# Patient Record
Sex: FEMALE | Race: BLACK OR AFRICAN AMERICAN | Employment: FULL TIME | URBAN - METROPOLITAN AREA
[De-identification: names, ages, dates, MRNs, and addresses within clinical notes are randomized per-mention and may not be internally consistent; named-entity substitution may affect disease eponyms.]

---

## 2022-07-24 ENCOUNTER — HOSPITAL ENCOUNTER (EMERGENCY)
Age: 47
Discharge: HOME | End: 2022-07-24

## 2022-07-24 VITALS
OXYGEN SATURATION: 100 % | BODY MASS INDEX: 30 KG/M2 | SYSTOLIC BLOOD PRESSURE: 148 MMHG | RESPIRATION RATE: 16 BRPM | HEIGHT: 63 IN | DIASTOLIC BLOOD PRESSURE: 87 MMHG | TEMPERATURE: 98.8 F | WEIGHT: 169.31 LBS | HEART RATE: 70 BPM

## 2022-07-24 DIAGNOSIS — R03.0 ELEVATED BLOOD PRESSURE READING: ICD-10-CM

## 2022-07-24 DIAGNOSIS — B96.89 BACTERIAL VAGINOSIS: ICD-10-CM

## 2022-07-24 DIAGNOSIS — N89.8 VAGINAL ODOR: Primary | ICD-10-CM

## 2022-07-24 DIAGNOSIS — N76.0 BACTERIAL VAGINOSIS: ICD-10-CM

## 2022-07-24 LAB
BACTERIA WET PREP: ABNORMAL
BACTERIA: ABNORMAL /HPF
BILIRUBIN URINE: NEGATIVE
BLOOD, URINE: NEGATIVE
CLARITY: CLEAR
CLUE CELLS: ABNORMAL
COLOR: YELLOW
EPITHELIAL CELLS WET PREP: ABNORMAL
EPITHELIAL CELLS, UA: 8 /HPF (ref 0–5)
GLUCOSE URINE: NEGATIVE MG/DL
HCG(URINE) PREGNANCY TEST: NEGATIVE
HYALINE CASTS: 0 /LPF (ref 0–8)
KETONES, URINE: NEGATIVE MG/DL
LEUKOCYTE ESTERASE, URINE: ABNORMAL
MICROSCOPIC EXAMINATION: YES
NITRITE, URINE: NEGATIVE
PH UA: 7 (ref 5–8)
PROTEIN UA: NEGATIVE MG/DL
RBC UA: 1 /HPF (ref 0–4)
RBC WET PREP: ABNORMAL
SOURCE WET PREP: ABNORMAL
SPECIFIC GRAVITY UA: 1.02 (ref 1–1.03)
TRICHOMONAS PREP: ABNORMAL
URINE REFLEX TO CULTURE: ABNORMAL
URINE TYPE: ABNORMAL
UROBILINOGEN, URINE: 1 E.U./DL
WBC UA: 3 /HPF (ref 0–5)
WBC WET PREP: ABNORMAL
YEAST WET PREP: ABNORMAL

## 2022-07-24 PROCEDURE — 81001 URINALYSIS AUTO W/SCOPE: CPT

## 2022-07-24 PROCEDURE — 87210 SMEAR WET MOUNT SALINE/INK: CPT

## 2022-07-24 PROCEDURE — 87591 N.GONORRHOEAE DNA AMP PROB: CPT

## 2022-07-24 PROCEDURE — 99283 EMERGENCY DEPT VISIT LOW MDM: CPT

## 2022-07-24 PROCEDURE — 87491 CHLMYD TRACH DNA AMP PROBE: CPT

## 2022-07-24 PROCEDURE — 84703 CHORIONIC GONADOTROPIN ASSAY: CPT

## 2022-07-24 RX ORDER — LISINOPRIL 20 MG/1
30 TABLET ORAL DAILY
COMMUNITY

## 2022-07-24 RX ORDER — METRONIDAZOLE 500 MG/1
500 TABLET ORAL 2 TIMES DAILY
Qty: 14 TABLET | Refills: 0 | Status: SHIPPED | OUTPATIENT
Start: 2022-07-24 | End: 2022-07-31

## 2022-07-24 ASSESSMENT — PAIN - FUNCTIONAL ASSESSMENT: PAIN_FUNCTIONAL_ASSESSMENT: 0-10

## 2022-07-24 ASSESSMENT — PAIN SCALES - GENERAL: PAINLEVEL_OUTOF10: 0

## 2022-07-24 NOTE — ED NOTES
Provider order placed for patient's discharge. Provider reviewed decision to discharge with the patient. Discharge paperwork and any prescriptions were reviewed with the patient. Patient verbalized understanding of discharge education and any prescriptions and has no further questions or further needs at this time. Patient left with all personal belongings and was stable upon departure. Patient thanked for choosing [facility] ([facility]) and informed to return should any need arise.        Izzy Solis RN  07/24/22 9832

## 2022-07-24 NOTE — ED PROVIDER NOTES
[address] [facility]        Pt Name: Tiffany Ingram  MRN: [id number]  [place] 1975  Date of evaluation: 7/24/2022  Provider: GER Bedolla      ADVANCED PRACTICE PROVIDER, I HAVE EVALUATED THIS PATIENT    CHIEF COMPLAINT     Vaginal odor      HISTORY OF PRESENT ILLNESS  (Location/Symptom, Timing/Onset, Context/Setting, Quality, Duration,Modifying Factors, Severity.)   Tiffany Ingram is a 40 y.o. female who presents to the emergencydepartment for vaginal odor for the past 1.5-2 weeks. Has a little bit of vaginal discharge. No bleeding. Denies dysuria, vaginal bleeding, genital sores or rashes. Denies fever nausea vomiting abdominal pain. Nursing Notes were reviewed and I agree. REVIEW OF SYSTEMS    (2-9 systems for level 4, 10 or more for level 5)   Review of Systems     Pertinent positives and negatives as per HPI. All other systems reviewed and are negative except as noted    PAST MEDICAL HISTORY         Diagnosis Date    Hypertension        SURGICAL HISTORY   History reviewed. No pertinent surgical history. CURRENT MEDICATIONS       Previous Medications    LISINOPRIL (PRINIVIL;ZESTRIL) 20 MG TABLET    Take 30 mg by mouth in the morning. ALLERGIES     Patient has no known allergies. FAMILY HISTORY     History reviewed. No pertinent family history. No family status information on file. SOCIAL HISTORY      reports that she has never smoked. She has never been exposed to tobacco smoke. She has never used smokeless tobacco. She reports current alcohol use. She reports that she does not use drugs.     PHYSICAL EXAM    (up to 7 for level 4, 8 or more for level 5)     ED Triage Vitals [07/24/22 1812]   BP Temp Temp Source Heart Rate Resp SpO2 Height Weight   (!) 171/101 98.8 °F (37.1 °C) Oral 78 16 100 % 5' 2.5\" (1.588 m) 169 lb 5 oz (76.8 kg)       Physical Exam  Constitutional:       General: She is not in acute distress. Appearance: Normal appearance. She is well-developed. She is not ill-appearing, toxic-appearing or diaphoretic. HENT:      Head: Normocephalic and atraumatic. Pulmonary:      Effort: Pulmonary effort is normal. No respiratory distress. Abdominal:      General: There is no distension. Palpations: Abdomen is soft. There is no mass. Tenderness: no abdominal tenderness There is no guarding or rebound. Hernia: No hernia is present. Musculoskeletal:         General: Normal range of motion. Cervical back: Normal range of motion and neck supple. Skin:     General: Skin is warm. Neurological:      Mental Status: She is alert. Psychiatric:         Mood and Affect: Mood normal.         Behavior: Behavior normal.         Thought Content:  Thought content normal.         Judgment: Judgment normal.       DIFFERENTIAL DIAGNOSIS   BV, yeast, STD, UTI, other    DIAGNOSTICRESULTS         RADIOLOGY:   Non-plain film images such as CT, Ultrasound and MRI are read by the radiologist. Plain radiographic images are visualized and preliminarily interpreted by GER Loya with the below findings:      Interpretation per the Radiologist below, if available at the time of this note:    No orders to display         LABS:  Labs Reviewed   WET PREP, GENITAL - Abnormal; Notable for the following components:       Result Value    Clue Cells, Wet Prep 1+ (*)     All other components within normal limits   URINALYSIS WITH REFLEX TO CULTURE - Abnormal; Notable for the following components:    Leukocyte Esterase, Urine TRACE (*)     All other components within normal limits   MICROSCOPIC URINALYSIS - Abnormal; Notable for the following components:    Bacteria, UA Rare (*)     Epithelial Cells, UA 8 (*)     All other components within normal limits   C.TRACHOMATIS N.GONORRHOEAE DNA   PREGNANCY, URINE       All other labs were within normal range or not returned as of this dictation. EMERGENCY DEPARTMENT COURSE and DIFFERENTIALDIAGNOSIS/MDM:   Vitals:    Vitals:    07/24/22 1812 07/24/22 1856   BP: (!) 171/101 (!) 159/90   Pulse: 78 76   Resp: 16 17   Temp: 98.8 °F (37.1 °C)    TempSrc: Oral    SpO2: 100% 97%   Weight: 169 lb 5 oz (76.8 kg)    Height: 5' 2.5\" (1.588 m)        Patient wasnontoxic, well appearing, afebrile with normal vital signs with exception of hypertension 171/101. Adele Raddle Urine and pre gneg. Wet prep with clue cells. Will treat with flagyl. FU with PCP in next few days for reeval and return for worsening. Is this patient to be included in the SEP-1 Core Measure due to severe sepsis or septic shock? No   Exclusion criteria - the patient is NOT to be included for SEP-1 Core Measure due to:  2+ SIRS criteria are not met        PROCEDURES:  None    FINAL IMPRESSION      1. Vaginal odor    2. Elevated blood pressure reading    3. Bacterial vaginosis        DISPOSITION/PLAN   DISPOSITION Decision To Discharge 07/24/2022 07:17:40 PM      PATIENT REFERRED TO:  Edward Lubin  [phone]  Schedule an appointment as soon as possible for a visit   for reevaluation and to recheck your blood pressure    [facility] Emergency Department  [address] [place] [address]  [phone]    As needed, If symptoms worsen    DISCHARGE MEDICATIONS:  New Prescriptions    METRONIDAZOLE (FLAGYL) 500 MG TABLET    Take 1 tablet by mouth in the morning and 1 tablet before bedtime. Do all this for 7 days.        (Please note that portions ofthis note were completed with a voice recognition program.  Efforts were made to edit the dictations but occasionally words are mis-transcribed.)    Tarik Brandon, [address] [place] [address]  07/24/22 1921

## 2022-07-27 LAB
C TRACH DNA GENITAL QL NAA+PROBE: NEGATIVE
N. GONORRHOEAE DNA: NEGATIVE

## 2022-11-29 ENCOUNTER — HOSPITAL ENCOUNTER (EMERGENCY)
Age: 47
Discharge: HOME | End: 2022-11-29

## 2022-11-29 ENCOUNTER — APPOINTMENT (OUTPATIENT)
Dept: GENERAL RADIOLOGY | Age: 47
End: 2022-11-29

## 2022-11-29 VITALS
DIASTOLIC BLOOD PRESSURE: 80 MMHG | WEIGHT: 170.19 LBS | SYSTOLIC BLOOD PRESSURE: 132 MMHG | BODY MASS INDEX: 31.32 KG/M2 | RESPIRATION RATE: 16 BRPM | TEMPERATURE: 98.3 F | HEIGHT: 62 IN | OXYGEN SATURATION: 99 % | HEART RATE: 90 BPM

## 2022-11-29 DIAGNOSIS — B34.9 VIRAL ILLNESS: Primary | ICD-10-CM

## 2022-11-29 DIAGNOSIS — K52.9 GASTROENTERITIS: ICD-10-CM

## 2022-11-29 LAB
ALBUMIN SERPL-MCNC: 4 G/DL (ref 3.4–5)
ALP BLD-CCNC: 76 U/L (ref 40–129)
ALT SERPL-CCNC: 18 U/L (ref 10–40)
ANION GAP SERPL CALCULATED.3IONS-SCNC: 14 MMOL/L (ref 3–16)
AST SERPL-CCNC: 17 U/L (ref 15–37)
BACTERIA: NORMAL /HPF
BASOPHILS ABSOLUTE: 0 K/UL (ref 0–0.2)
BASOPHILS RELATIVE PERCENT: 0.4 %
BILIRUB SERPL-MCNC: <0.2 MG/DL (ref 0–1)
BILIRUBIN DIRECT: <0.2 MG/DL (ref 0–0.3)
BILIRUBIN URINE: NEGATIVE
BILIRUBIN, INDIRECT: NORMAL MG/DL (ref 0–1)
BLOOD, URINE: NEGATIVE
BUN BLDV-MCNC: 10 MG/DL (ref 7–20)
CALCIUM SERPL-MCNC: 9.4 MG/DL (ref 8.3–10.6)
CHLORIDE BLD-SCNC: 100 MMOL/L (ref 99–110)
CLARITY: CLEAR
CO2: 23 MMOL/L (ref 21–32)
COLOR: YELLOW
CREAT SERPL-MCNC: <0.5 MG/DL (ref 0.6–1.1)
EKG ATRIAL RATE: 90 BPM
EKG DIAGNOSIS: NORMAL
EKG P AXIS: 58 DEGREES
EKG P-R INTERVAL: 142 MS
EKG Q-T INTERVAL: 332 MS
EKG QRS DURATION: 82 MS
EKG QTC CALCULATION (BAZETT): 406 MS
EKG R AXIS: 64 DEGREES
EKG T AXIS: 9 DEGREES
EKG VENTRICULAR RATE: 90 BPM
EOSINOPHILS ABSOLUTE: 0 K/UL (ref 0–0.6)
EOSINOPHILS RELATIVE PERCENT: 0.3 %
EPITHELIAL CELLS, UA: 2 /HPF (ref 0–5)
GFR SERPL CREATININE-BSD FRML MDRD: >60 ML/MIN/{1.73_M2}
GLUCOSE BLD-MCNC: 104 MG/DL (ref 70–99)
GLUCOSE URINE: NEGATIVE MG/DL
HCG(URINE) PREGNANCY TEST: NEGATIVE
HCT VFR BLD CALC: 37 % (ref 36–48)
HEMOGLOBIN: 11.8 G/DL (ref 12–16)
HYALINE CASTS: 0 /LPF (ref 0–8)
KETONES, URINE: ABNORMAL MG/DL
LEUKOCYTE ESTERASE, URINE: NEGATIVE
LIPASE: 25 U/L (ref 13–60)
LYMPHOCYTES ABSOLUTE: 0.5 K/UL (ref 1–5.1)
LYMPHOCYTES RELATIVE PERCENT: 5.1 %
MCH RBC QN AUTO: 27 PG (ref 26–34)
MCHC RBC AUTO-ENTMCNC: 31.9 G/DL (ref 31–36)
MCV RBC AUTO: 84.7 FL (ref 80–100)
MICROSCOPIC EXAMINATION: YES
MONOCYTES ABSOLUTE: 0.3 K/UL (ref 0–1.3)
MONOCYTES RELATIVE PERCENT: 3.7 %
NEUTROPHILS ABSOLUTE: 8.1 K/UL (ref 1.7–7.7)
NEUTROPHILS RELATIVE PERCENT: 90.5 %
NITRITE, URINE: NEGATIVE
PDW BLD-RTO: 16.1 % (ref 12.4–15.4)
PH UA: 7.5 (ref 5–8)
PLATELET # BLD: 363 K/UL (ref 135–450)
PMV BLD AUTO: 7.7 FL (ref 5–10.5)
POTASSIUM SERPL-SCNC: 3.7 MMOL/L (ref 3.5–5.1)
PRO-BNP: 170 PG/ML (ref 0–124)
PROTEIN UA: 30 MG/DL
RAPID INFLUENZA  B AGN: NEGATIVE
RAPID INFLUENZA A AGN: NEGATIVE
RBC # BLD: 4.36 M/UL (ref 4–5.2)
RBC UA: 1 /HPF (ref 0–4)
SARS-COV-2, NAAT: NOT DETECTED
SODIUM BLD-SCNC: 137 MMOL/L (ref 136–145)
SPECIFIC GRAVITY UA: 1.03 (ref 1–1.03)
TOTAL PROTEIN: 7.8 G/DL (ref 6.4–8.2)
TROPONIN: <0.01 NG/ML
URINE REFLEX TO CULTURE: ABNORMAL
URINE TYPE: ABNORMAL
UROBILINOGEN, URINE: 0.2 E.U./DL
WBC # BLD: 9 K/UL (ref 4–11)
WBC UA: 2 /HPF (ref 0–5)

## 2022-11-29 PROCEDURE — 93010 ELECTROCARDIOGRAM REPORT: CPT | Performed by: INTERNAL MEDICINE

## 2022-11-29 PROCEDURE — 87804 INFLUENZA ASSAY W/OPTIC: CPT

## 2022-11-29 PROCEDURE — 6370000000 HC RX 637 (ALT 250 FOR IP): Performed by: NURSE PRACTITIONER

## 2022-11-29 PROCEDURE — 81001 URINALYSIS AUTO W/SCOPE: CPT

## 2022-11-29 PROCEDURE — 71046 X-RAY EXAM CHEST 2 VIEWS: CPT

## 2022-11-29 PROCEDURE — 87635 SARS-COV-2 COVID-19 AMP PRB: CPT

## 2022-11-29 PROCEDURE — 80076 HEPATIC FUNCTION PANEL: CPT

## 2022-11-29 PROCEDURE — 84484 ASSAY OF TROPONIN QUANT: CPT

## 2022-11-29 PROCEDURE — 80048 BASIC METABOLIC PNL TOTAL CA: CPT

## 2022-11-29 PROCEDURE — 96360 HYDRATION IV INFUSION INIT: CPT

## 2022-11-29 PROCEDURE — 2580000003 HC RX 258: Performed by: NURSE PRACTITIONER

## 2022-11-29 PROCEDURE — 85025 COMPLETE CBC W/AUTO DIFF WBC: CPT

## 2022-11-29 PROCEDURE — 84703 CHORIONIC GONADOTROPIN ASSAY: CPT

## 2022-11-29 PROCEDURE — 93005 ELECTROCARDIOGRAM TRACING: CPT | Performed by: NURSE PRACTITIONER

## 2022-11-29 PROCEDURE — 99285 EMERGENCY DEPT VISIT HI MDM: CPT

## 2022-11-29 PROCEDURE — 83880 ASSAY OF NATRIURETIC PEPTIDE: CPT

## 2022-11-29 PROCEDURE — 83690 ASSAY OF LIPASE: CPT

## 2022-11-29 RX ORDER — ACETAMINOPHEN 500 MG
1000 TABLET ORAL ONCE
Status: COMPLETED | OUTPATIENT
Start: 2022-11-29 | End: 2022-11-29

## 2022-11-29 RX ORDER — ONDANSETRON 2 MG/ML
4 INJECTION INTRAMUSCULAR; INTRAVENOUS ONCE
Status: DISCONTINUED | OUTPATIENT
Start: 2022-11-29 | End: 2022-11-29 | Stop reason: HOSPADM

## 2022-11-29 RX ORDER — KETOROLAC TROMETHAMINE 30 MG/ML
30 INJECTION, SOLUTION INTRAMUSCULAR; INTRAVENOUS ONCE
Status: DISCONTINUED | OUTPATIENT
Start: 2022-11-29 | End: 2022-11-29

## 2022-11-29 RX ORDER — 0.9 % SODIUM CHLORIDE 0.9 %
1000 INTRAVENOUS SOLUTION INTRAVENOUS ONCE
Status: COMPLETED | OUTPATIENT
Start: 2022-11-29 | End: 2022-11-29

## 2022-11-29 RX ORDER — ONDANSETRON 4 MG/1
4 TABLET, FILM COATED ORAL EVERY 8 HOURS PRN
Qty: 20 TABLET | Refills: 0 | Status: SHIPPED | OUTPATIENT
Start: 2022-11-29

## 2022-11-29 RX ADMIN — IBUPROFEN 600 MG: 200 TABLET, FILM COATED ORAL at 14:24

## 2022-11-29 RX ADMIN — ACETAMINOPHEN 1000 MG: 500 TABLET ORAL at 13:00

## 2022-11-29 RX ADMIN — SODIUM CHLORIDE 1000 ML: 9 INJECTION, SOLUTION INTRAVENOUS at 15:36

## 2022-11-29 ASSESSMENT — PAIN SCALES - GENERAL
PAINLEVEL_OUTOF10: 8
PAINLEVEL_OUTOF10: 5
PAINLEVEL_OUTOF10: 9
PAINLEVEL_OUTOF10: 9

## 2022-11-29 ASSESSMENT — PAIN DESCRIPTION - LOCATION
LOCATION: HEAD
LOCATION: HEAD

## 2022-11-29 ASSESSMENT — PAIN - FUNCTIONAL ASSESSMENT: PAIN_FUNCTIONAL_ASSESSMENT: 0-10

## 2022-11-29 ASSESSMENT — PAIN DESCRIPTION - DESCRIPTORS: DESCRIPTORS: DISCOMFORT

## 2022-11-29 ASSESSMENT — PAIN DESCRIPTION - PAIN TYPE: TYPE: ACUTE PAIN

## 2022-11-29 NOTE — ED PROVIDER NOTES
[address] [facility]  [address] [address]  Dept: [phone]  Loc: [address] ENCOUNTER        This patient was not seen or evaluated by the attending physician. I evaluated this patient, the attending physician was available for consultation. CHIEF COMPLAINT    Chief Complaint   Patient presents with    Illness     Cough, fatigue, headache, emesis on going for a week. YUE Carvajal is a 56 y.o. female who presents to the emergency department with a 1 week complaint of cough, chest congestion, runny stuffy nose, intermittent headaches and overall weakness. She has had intermittent vomiting as well. She denies chest pain, lightheadedness, dizziness, visual disturbances, ear pain, abdominal pain, urinary symptoms. REVIEW OF SYSTEMS    Pulmonary: no sore throat, see HPI, no hemoptysis  General: subjective fever, + myalgia  GI: + nausea, no vomiting  All other systems reviewed and are negative. PAST MEDICAL AND SURGICAL HISTORY    Past Medical History:   Diagnosis Date    Hypertension      No past surgical history on file. CURRENT MEDICATIONS  (may include discharge medications prescribed in the ED)  Current Outpatient Rx   Medication Sig Dispense Refill    ondansetron (ZOFRAN) 4 MG tablet Take 1 tablet by mouth every 8 hours as needed for Nausea 20 tablet 0    lisinopril (PRINIVIL;ZESTRIL) 20 MG tablet Take 30 mg by mouth in the morning. ALLERGIES    No Known Allergies    FAMILY AND SOCIAL HISTORY    No family history on file.   Social History     Socioeconomic History    Marital status: Single   Tobacco Use    Smoking status: Never     Passive exposure: Never    Smokeless tobacco: Never   Substance and Sexual Activity    Alcohol use: Yes     Comment: social    Drug use: Never       PHYSICAL EXAM    VITAL SIGNS: /80   Pulse 90   Temp 98.3 °F (36.8 °C) (Oral) Resp 16   Ht 5' 2\" (1.575 m)   Wt 170 lb 3.1 oz (77.2 kg)   SpO2 99%   BMI 31.13 kg/m²   Constitutional:  Well developed, well nourished, no acute distress  Eyes: Sclera nonicteric, conjunctiva normal   Ears: external ears normal  Throat/Face:  no exudate or edema of the throat, no trismus  Neck: Supple, no enlarged tonsillar lymphadenopathy  Respiratory:  Lungs clear to auscultation bilaterally, no retractions  Cardiovascular:  tachycardic rate, regular rhythm  GI: soft, nontender, nondistended  Neurologic: Awake, alert and oriented, no slurred speech  Integument: Skin is warm and dry, no rash    RADIOLOGY/PROCEDURES    XR CHEST (2 VW)   Final Result   No acute process. Labs Reviewed   CBC WITH AUTO DIFFERENTIAL - Abnormal; Notable for the following components:       Result Value    Hemoglobin 11.8 (*)     RDW 16.1 (*)     Neutrophils Absolute 8.1 (*)     Lymphocytes Absolute 0.5 (*)     All other components within normal limits   BASIC METABOLIC PANEL - Abnormal; Notable for the following components:    Glucose 104 (*)     Creatinine <0.5 (*)     All other components within normal limits   URINALYSIS WITH REFLEX TO CULTURE - Abnormal; Notable for the following components:    Ketones, Urine TRACE (*)     Protein, UA 30 (*)     All other components within normal limits   BRAIN NATRIURETIC PEPTIDE - Abnormal; Notable for the following components:    Pro- (*)     All other components within normal limits   COVID-19, RAPID   RAPID INFLUENZA A/B ANTIGENS   HEPATIC FUNCTION PANEL   LIPASE   PREGNANCY, URINE   TROPONIN   MICROSCOPIC URINALYSIS     EKG    Ventricular rate 90 bpm, TX interval 142 ms, QRS duration 82 ms,  ms  No ST elevation or depression. Interpretation is a normal sinus rhythm    No old EKG found for comparison      ED COURSE & MEDICAL DECISION MAKING    See chart for details of medications given.     Vitals:    11/29/22 1024 11/29/22 1645   BP: (!) 146/94 132/80   Pulse: (!) 111 90   Resp: 17 16   Temp: 98.3 °F (36.8 °C)    TempSrc: Oral    SpO2: 98% 99%   Weight: 170 lb 3.1 oz (77.2 kg)    Height: 5' 2\" (1.575 m)      Medications   acetaminophen (TYLENOL) tablet 1,000 mg (1,000 mg Oral Given 11/29/22 1300)   ibuprofen (ADVIL;MOTRIN) tablet 600 mg (600 mg Oral Given 11/29/22 1424)   0.9 % sodium chloride bolus (0 mLs IntraVENous Stopped 11/29/22 1636)     I have seen and evaluated this patient. My attending physician was available for consultation. Differential diagnoses: Influenza, Other viral illness, Meningitis, Group A strep, Airway Obstruction, Pneumonia, Hypoxemia, Dehydration, other. She is afebrile. She is nontoxic in appearance. Initially she was a little bit tachycardic with a heart rate of 104 bpm/min. She was given fluids, ibuprofen and Tylenol. White blood cell count is 9    CMP is unremarkable with a serum glucose of 104, proBNP 170, troponin less than 0.01. No acute changes on EKG. Negative COVID and negative influenza    Urinalysis is negative for infection. She is not pregnant. Chest x-ray interpreted by radiology and reviewed by myself showed no acute cardiopulmonary process. She has not vomited while here. No diarrhea    Patient was reevaluated. Her tachycardia has resolved. She is feeling better. She can be discharged with PCP follow-up. Otherwise symptomatic treatment and return to the emergency department for worsening symptoms. Patient verbalized understanding of the discharge instructions. FINAL IMPRESSION    1. Viral illness    2.  Gastroenteritis        PLAN  Outpatient medications, followup, and discharge instructions (see EMR)      (Please note that this note was completed with a voice recognition program.  Every attempt was made to edit the dictations, but inevitably there remain words that are mis-transcribed.)             Emani Goff, CYNDI - CNP  12/05/22 1619 CYNDI VAUGHN - REMIGIO  12/06/22 1047

## 2022-11-29 NOTE — ED PROVIDER NOTES
The Ekg interpreted by me shows  normal sinus rhythm with a rate of 90  Axis is   Normal  QTc is  normal  Intervals and Durations are unremarkable.       ST Segments: nonspecific changes  No EKGs available for comparison        Alena Gutierrez MD  11/29/22 8692

## 2023-07-17 ENCOUNTER — APPOINTMENT (OUTPATIENT)
Dept: CT IMAGING | Age: 48
DRG: 305 | End: 2023-07-17

## 2023-07-17 ENCOUNTER — HOSPITAL ENCOUNTER (INPATIENT)
Age: 48
LOS: 1 days | Discharge: HOME | DRG: 305 | End: 2023-07-19
Attending: STUDENT IN AN ORGANIZED HEALTH CARE EDUCATION/TRAINING PROGRAM | Admitting: STUDENT IN AN ORGANIZED HEALTH CARE EDUCATION/TRAINING PROGRAM

## 2023-07-17 ENCOUNTER — APPOINTMENT (OUTPATIENT)
Dept: GENERAL RADIOLOGY | Age: 48
DRG: 305 | End: 2023-07-17

## 2023-07-17 DIAGNOSIS — I10 ESSENTIAL HYPERTENSION: ICD-10-CM

## 2023-07-17 DIAGNOSIS — R51.9 ACUTE NONINTRACTABLE HEADACHE, UNSPECIFIED HEADACHE TYPE: ICD-10-CM

## 2023-07-17 DIAGNOSIS — R42 VERTIGO: Primary | ICD-10-CM

## 2023-07-17 LAB
ALBUMIN SERPL-MCNC: 4.6 G/DL (ref 3.4–5)
ALBUMIN/GLOB SERPL: 1.3 {RATIO} (ref 1.1–2.2)
ALP SERPL-CCNC: 79 U/L (ref 40–129)
ALT SERPL-CCNC: 19 U/L (ref 10–40)
ANION GAP SERPL CALCULATED.3IONS-SCNC: 11 MMOL/L (ref 3–16)
AST SERPL-CCNC: 24 U/L (ref 15–37)
BASOPHILS # BLD: 0 K/UL (ref 0–0.2)
BASOPHILS NFR BLD: 0.6 %
BILIRUB SERPL-MCNC: <0.2 MG/DL (ref 0–1)
BUN SERPL-MCNC: 11 MG/DL (ref 7–20)
CALCIUM SERPL-MCNC: 9.7 MG/DL (ref 8.3–10.6)
CHLORIDE SERPL-SCNC: 106 MMOL/L (ref 99–110)
CO2 SERPL-SCNC: 25 MMOL/L (ref 21–32)
CREAT SERPL-MCNC: 0.6 MG/DL (ref 0.6–1.1)
DEPRECATED RDW RBC AUTO: 16.6 % (ref 12.4–15.4)
EOSINOPHIL # BLD: 0.1 K/UL (ref 0–0.6)
EOSINOPHIL NFR BLD: 2.2 %
GFR SERPLBLD CREATININE-BSD FMLA CKD-EPI: >60 ML/MIN/{1.73_M2}
GLUCOSE SERPL-MCNC: 91 MG/DL (ref 70–99)
HCG SERPL QL: NEGATIVE
HCT VFR BLD AUTO: 36.8 % (ref 36–48)
HGB BLD-MCNC: 12.2 G/DL (ref 12–16)
INR PPP: 0.96 (ref 0.84–1.16)
LYMPHOCYTES # BLD: 2.5 K/UL (ref 1–5.1)
LYMPHOCYTES NFR BLD: 37 %
MCH RBC QN AUTO: 27.1 PG (ref 26–34)
MCHC RBC AUTO-ENTMCNC: 33.1 G/DL (ref 31–36)
MCV RBC AUTO: 81.9 FL (ref 80–100)
MONOCYTES # BLD: 0.4 K/UL (ref 0–1.3)
MONOCYTES NFR BLD: 5.2 %
NEUTROPHILS # BLD: 3.8 K/UL (ref 1.7–7.7)
NEUTROPHILS NFR BLD: 55 %
PLATELET # BLD AUTO: 401 K/UL (ref 135–450)
PMV BLD AUTO: 8 FL (ref 5–10.5)
POTASSIUM SERPL-SCNC: 3.8 MMOL/L (ref 3.5–5.1)
PROT SERPL-MCNC: 8.2 G/DL (ref 6.4–8.2)
PROTHROMBIN TIME: 12.8 SEC (ref 11.5–14.8)
RBC # BLD AUTO: 4.49 M/UL (ref 4–5.2)
SODIUM SERPL-SCNC: 142 MMOL/L (ref 136–145)
TROPONIN, HIGH SENSITIVITY: 9 NG/L (ref 0–14)
WBC # BLD AUTO: 6.8 K/UL (ref 4–11)

## 2023-07-17 PROCEDURE — 96374 THER/PROPH/DIAG INJ IV PUSH: CPT

## 2023-07-17 PROCEDURE — 84703 CHORIONIC GONADOTROPIN ASSAY: CPT

## 2023-07-17 PROCEDURE — 70450 CT HEAD/BRAIN W/O DYE: CPT

## 2023-07-17 PROCEDURE — 6370000000 HC RX 637 (ALT 250 FOR IP): Performed by: STUDENT IN AN ORGANIZED HEALTH CARE EDUCATION/TRAINING PROGRAM

## 2023-07-17 PROCEDURE — 85025 COMPLETE CBC W/AUTO DIFF WBC: CPT

## 2023-07-17 PROCEDURE — 70496 CT ANGIOGRAPHY HEAD: CPT

## 2023-07-17 PROCEDURE — 6360000004 HC RX CONTRAST MEDICATION: Performed by: STUDENT IN AN ORGANIZED HEALTH CARE EDUCATION/TRAINING PROGRAM

## 2023-07-17 PROCEDURE — 71045 X-RAY EXAM CHEST 1 VIEW: CPT

## 2023-07-17 PROCEDURE — 96375 TX/PRO/DX INJ NEW DRUG ADDON: CPT

## 2023-07-17 PROCEDURE — 93005 ELECTROCARDIOGRAM TRACING: CPT | Performed by: STUDENT IN AN ORGANIZED HEALTH CARE EDUCATION/TRAINING PROGRAM

## 2023-07-17 PROCEDURE — 80053 COMPREHEN METABOLIC PANEL: CPT

## 2023-07-17 PROCEDURE — 84484 ASSAY OF TROPONIN QUANT: CPT

## 2023-07-17 PROCEDURE — 6360000002 HC RX W HCPCS: Performed by: STUDENT IN AN ORGANIZED HEALTH CARE EDUCATION/TRAINING PROGRAM

## 2023-07-17 PROCEDURE — 99285 EMERGENCY DEPT VISIT HI MDM: CPT

## 2023-07-17 PROCEDURE — 85610 PROTHROMBIN TIME: CPT

## 2023-07-17 RX ORDER — MECLIZINE HYDROCHLORIDE 25 MG/1
25 TABLET ORAL ONCE
Status: COMPLETED | OUTPATIENT
Start: 2023-07-17 | End: 2023-07-17

## 2023-07-17 RX ORDER — DIPHENHYDRAMINE HYDROCHLORIDE 50 MG/ML
25 INJECTION INTRAMUSCULAR; INTRAVENOUS ONCE
Status: COMPLETED | OUTPATIENT
Start: 2023-07-17 | End: 2023-07-17

## 2023-07-17 RX ORDER — PROCHLORPERAZINE EDISYLATE 5 MG/ML
10 INJECTION INTRAMUSCULAR; INTRAVENOUS ONCE
Status: COMPLETED | OUTPATIENT
Start: 2023-07-17 | End: 2023-07-17

## 2023-07-17 RX ADMIN — PROCHLORPERAZINE EDISYLATE 10 MG: 5 INJECTION, SOLUTION INTRAMUSCULAR; INTRAVENOUS at 20:15

## 2023-07-17 RX ADMIN — MECLIZINE HYDROCHLORIDE 25 MG: 25 TABLET ORAL at 22:35

## 2023-07-17 RX ADMIN — IOPAMIDOL 75 ML: 755 INJECTION, SOLUTION INTRAVENOUS at 21:49

## 2023-07-17 RX ADMIN — DIPHENHYDRAMINE HYDROCHLORIDE 25 MG: 50 INJECTION, SOLUTION INTRAMUSCULAR; INTRAVENOUS at 20:14

## 2023-07-17 ASSESSMENT — VISUAL ACUITY
OS: 20/25
OU: 20/20
OD: 20/40

## 2023-07-17 ASSESSMENT — PAIN - FUNCTIONAL ASSESSMENT: PAIN_FUNCTIONAL_ASSESSMENT: NONE - DENIES PAIN

## 2023-07-17 NOTE — ED TRIAGE NOTES
48y/o female presents to the ED with high b/p onset Friday. Pt states she was on b/p meds but did not have any medication for 1yr. Pt states she just recently moved back from [place]. +nausea. +blurred vision/double vision.

## 2023-07-18 ENCOUNTER — APPOINTMENT (OUTPATIENT)
Dept: MRI IMAGING | Age: 48
DRG: 305 | End: 2023-07-18

## 2023-07-18 PROBLEM — R42 VERTIGO: Status: ACTIVE | Noted: 2023-07-18

## 2023-07-18 PROBLEM — I16.0 HYPERTENSIVE URGENCY, MALIGNANT: Status: ACTIVE | Noted: 2023-07-18

## 2023-07-18 PROBLEM — R42 VERTIGO: Status: RESOLVED | Noted: 2023-07-18 | Resolved: 2023-07-18

## 2023-07-18 PROBLEM — I10 UNCONTROLLED HYPERTENSION: Status: ACTIVE | Noted: 2023-07-18

## 2023-07-18 LAB
CHOLEST SERPL-MCNC: 193 MG/DL (ref 0–199)
EKG ATRIAL RATE: 65 BPM
EKG DIAGNOSIS: NORMAL
EKG P AXIS: 11 DEGREES
EKG P-R INTERVAL: 134 MS
EKG Q-T INTERVAL: 372 MS
EKG QRS DURATION: 82 MS
EKG QTC CALCULATION (BAZETT): 386 MS
EKG R AXIS: 40 DEGREES
EKG T AXIS: 72 DEGREES
EKG VENTRICULAR RATE: 65 BPM
HDLC SERPL-MCNC: 52 MG/DL (ref 40–60)
LDLC SERPL CALC-MCNC: 127 MG/DL
LV EF: 58 %
LVEF MODALITY: NORMAL
TRIGL SERPL-MCNC: 71 MG/DL (ref 0–150)
VLDLC SERPL CALC-MCNC: 14 MG/DL

## 2023-07-18 PROCEDURE — 1200000000 HC SEMI PRIVATE

## 2023-07-18 PROCEDURE — 97161 PT EVAL LOW COMPLEX 20 MIN: CPT

## 2023-07-18 PROCEDURE — 80061 LIPID PANEL: CPT

## 2023-07-18 PROCEDURE — 97116 GAIT TRAINING THERAPY: CPT

## 2023-07-18 PROCEDURE — 93010 ELECTROCARDIOGRAM REPORT: CPT | Performed by: INTERNAL MEDICINE

## 2023-07-18 PROCEDURE — 6360000002 HC RX W HCPCS: Performed by: INTERNAL MEDICINE

## 2023-07-18 PROCEDURE — 70551 MRI BRAIN STEM W/O DYE: CPT

## 2023-07-18 PROCEDURE — 70544 MR ANGIOGRAPHY HEAD W/O DYE: CPT

## 2023-07-18 PROCEDURE — 93306 TTE W/DOPPLER COMPLETE: CPT

## 2023-07-18 PROCEDURE — 36415 COLL VENOUS BLD VENIPUNCTURE: CPT

## 2023-07-18 PROCEDURE — 6370000000 HC RX 637 (ALT 250 FOR IP): Performed by: INTERNAL MEDICINE

## 2023-07-18 RX ORDER — ASPIRIN 300 MG/1
300 SUPPOSITORY RECTAL DAILY
Status: DISCONTINUED | OUTPATIENT
Start: 2023-07-18 | End: 2023-07-19 | Stop reason: HOSPADM

## 2023-07-18 RX ORDER — POLYETHYLENE GLYCOL 3350 17 G/17G
17 POWDER, FOR SOLUTION ORAL DAILY PRN
Status: DISCONTINUED | OUTPATIENT
Start: 2023-07-18 | End: 2023-07-19 | Stop reason: HOSPADM

## 2023-07-18 RX ORDER — ONDANSETRON 2 MG/ML
4 INJECTION INTRAMUSCULAR; INTRAVENOUS EVERY 6 HOURS PRN
Status: DISCONTINUED | OUTPATIENT
Start: 2023-07-18 | End: 2023-07-19 | Stop reason: HOSPADM

## 2023-07-18 RX ORDER — ACETAMINOPHEN 325 MG/1
650 TABLET ORAL EVERY 4 HOURS PRN
Status: DISCONTINUED | OUTPATIENT
Start: 2023-07-18 | End: 2023-07-19 | Stop reason: HOSPADM

## 2023-07-18 RX ORDER — ATORVASTATIN CALCIUM 80 MG/1
80 TABLET, FILM COATED ORAL NIGHTLY
Status: DISCONTINUED | OUTPATIENT
Start: 2023-07-18 | End: 2023-07-19 | Stop reason: HOSPADM

## 2023-07-18 RX ORDER — ENOXAPARIN SODIUM 100 MG/ML
40 INJECTION SUBCUTANEOUS DAILY
Status: DISCONTINUED | OUTPATIENT
Start: 2023-07-18 | End: 2023-07-19 | Stop reason: HOSPADM

## 2023-07-18 RX ORDER — ONDANSETRON 4 MG/1
4 TABLET, ORALLY DISINTEGRATING ORAL EVERY 8 HOURS PRN
Status: DISCONTINUED | OUTPATIENT
Start: 2023-07-18 | End: 2023-07-19 | Stop reason: HOSPADM

## 2023-07-18 RX ORDER — LABETALOL HYDROCHLORIDE 5 MG/ML
10 INJECTION, SOLUTION INTRAVENOUS EVERY 10 MIN PRN
Status: DISCONTINUED | OUTPATIENT
Start: 2023-07-18 | End: 2023-07-19 | Stop reason: HOSPADM

## 2023-07-18 RX ORDER — ASPIRIN 81 MG/1
81 TABLET ORAL DAILY
Status: DISCONTINUED | OUTPATIENT
Start: 2023-07-18 | End: 2023-07-19 | Stop reason: HOSPADM

## 2023-07-18 RX ADMIN — ATORVASTATIN CALCIUM 80 MG: 80 TABLET, FILM COATED ORAL at 20:24

## 2023-07-18 RX ADMIN — ACETAMINOPHEN 650 MG: 325 TABLET ORAL at 17:10

## 2023-07-18 RX ADMIN — ACETAMINOPHEN 650 MG: 325 TABLET ORAL at 23:19

## 2023-07-18 RX ADMIN — ENOXAPARIN SODIUM 40 MG: 100 INJECTION SUBCUTANEOUS at 09:35

## 2023-07-18 RX ADMIN — ASPIRIN 81 MG: 81 TABLET, COATED ORAL at 09:36

## 2023-07-18 ASSESSMENT — PAIN DESCRIPTION - ORIENTATION: ORIENTATION: ANTERIOR

## 2023-07-18 ASSESSMENT — PAIN SCALES - GENERAL: PAINLEVEL_OUTOF10: 5

## 2023-07-18 ASSESSMENT — PAIN DESCRIPTION - DESCRIPTORS: DESCRIPTORS: ACHING

## 2023-07-18 ASSESSMENT — PAIN DESCRIPTION - LOCATION: LOCATION: HEAD

## 2023-07-18 NOTE — CARE COORDINATION
CM spoke with patient at bedside. She is from home with daugher, independent pta. No CM needs anticipated at discharge. She is interested in PCP list and clinic information. CM to provide this information. Patient's family to transport at discharge.     Mikie Shaffer RN, BSN,    Ortho/Neuro   [phone]

## 2023-07-18 NOTE — ED NOTES
Report called to Leila CORNELIUS at [facility] and informed squad here now.      Sharri Taylor RN  07/18/23 4234

## 2023-07-18 NOTE — PLAN OF CARE
Problem: Discharge Planning  Goal: Discharge to home or other facility with appropriate resources  Outcome: Progressing   Pt is active in discharge planning. Problem: Safety - Adult  Goal: Free from fall injury  Outcome: Progressing   Fall precautions in place. Bed alarm on. Bed locked in low position. Problem: Pain  Goal: Verbalizes/displays adequate comfort level or baseline comfort level  Outcome: Progressing   Pt denies pain at this time.

## 2023-07-18 NOTE — PLAN OF CARE
Problem: Discharge Planning  Goal: Discharge to home or other facility with appropriate resources  7/18/2023 1553 by Rudolph Ortez RN  Outcome: Progressing     Problem: Safety - Adult  Goal: Free from fall injury  7/18/2023 1553 by Rudolph Ortez RN  Outcome: Progressing   -fall precautions in place, call light within reach    Problem: Pain  Goal: Verbalizes/displays adequate comfort level or baseline comfort level  7/18/2023 1553 by Rudolph Ortez RN  Outcome: Progressing   -pt denying pain

## 2023-07-18 NOTE — CONSULTS
Neurology / Neurocritical Care Consult Note      Justin Dubois MD is requesting this consult. Reason for Consult: Dizziness  Admission Chief Complaint: Dizziness    History of Present Illness     Donell Clemens is a 56 y.o. y/o female with history significant for hypertension. History provided by: Patient    Donell Clemens is a 52yo female who presented to the hospital with headache, dizziness and blurry vision. Patient states that since Friday, she has been feeling dizzy and lightheaded. She reports that she has had symptoms of dizziness in the past but not to this severity and that her symptoms of headache and blurry vision are new. She stated that the room felt like it was spinning comparable to the feeling of alcohol intoxication. She also endorsed having a headache and blurry vision, stating that her eyes felt \"hot\". She noticed these visual symptoms on her drive to [place] from [facility] on Saturday. Patient says that she normally takes lisinopril for her blood pressure and is normally compliant when she has it, but ran out of her medications about 3 months ago. She denies having any ear pain or ringing, aural fullness, changes to her gait, dysarthria or dysphagia. She denies visiting any chiropractor or incurring any recent trauma to her neck. Review of data from external sources including: [facility]    REVIEW OF SYSTEMS:   Constitutional- No weight loss or fevers     Past Medical, Surgical, Family, and Social History   PAST MEDICAL HISTORY:  Past Medical History:   Diagnosis Date    Hypertension      SURGICAL HISTORY:  History reviewed. No pertinent surgical history. FAMILY HISTORY & SOCIAL HISTORY:  Family history non-contributory  Family History   Problem Relation Age of Onset    Hypertension Mother     Hypertension Father      Social History     Tobacco Use    Smoking status: Never     Passive exposure: Never    Smokeless tobacco: Never   Substance Use Topics    Alcohol use:  Yes

## 2023-07-18 NOTE — H&P
V2.0  History and Physical      Name:  Sophie Aguilar /Age/Sex: 1975  (56 y.o. female)   MRN & CSN:  [id number] & [id number] Encounter Date/Time: 2023 5:28 AM EDT   Location:  282/6584-14 PCP: No primary care provider on file. Hospital Day: 2    Assessment and Plan:   Sophie Aguilar is a 56 y.o. female with a pmh of hypertension on no meds for the past 4 months who presents with Hypertensive urgency, malignant    Hospital Problems             Last Modified POA    * (Principal) Hypertensive urgency, malignant 2023 Yes       Plan:  Hydralazine as needed for systolic blood pressure greater than 762 or diastolic greater than 266. Check UA for protein  Check EKG, 2D echo  MRI of the brain  Monitor on telemetry    Disposition:   Current Living situation: Home  Expected Disposition: Home  Estimated D/C: 1 to 2 days    Diet ADULT DIET; Regular   DVT Prophylaxis [] Lovenox, []  Heparin, [] SCDs, [] Ambulation,  [] Eliquis, [] Xarelto, [] Coumadin   Code Status Full Code   Surrogate Decision Maker/ POA Daughter     Personally reviewed Lab Studies and Imaging     Discussed management of the case with no one who recommended n/a. EKG interpreted personally and results none available. Imaging that was interpreted personally includes chest x-ray and results negative for any acute cardiopulmonary process. Drugs that require monitoring for toxicity include none and the method of monitoring was n/a. History from:     patient    History of Present Illness:     Chief Complaint: Dizziness and headache  Sophie Aguilar is a 56 y.o. female with pmh of hypertension on no medications for the past 4 months who presents with dizziness and headache. Patient woke up on the morning of the day of admission with a headache with associated nausea, vomiting, dizziness but no chest pain, shortness of breath focal tingling, weakness or numbness, visual changes.   Since symptoms did not improve as the day progressed she

## 2023-07-18 NOTE — ED NOTES
Pt to [address] [facility] per [facility].      Sagar Nicole RN  07/18/23 8907

## 2023-07-19 VITALS
TEMPERATURE: 98.1 F | BODY MASS INDEX: 31.34 KG/M2 | OXYGEN SATURATION: 94 % | DIASTOLIC BLOOD PRESSURE: 86 MMHG | SYSTOLIC BLOOD PRESSURE: 126 MMHG | HEIGHT: 63 IN | HEART RATE: 86 BPM | WEIGHT: 176.9 LBS | RESPIRATION RATE: 16 BRPM

## 2023-07-19 LAB
DEPRECATED RDW RBC AUTO: 15.6 % (ref 12.4–15.4)
EST. AVERAGE GLUCOSE BLD GHB EST-MCNC: 119.8 MG/DL
HBA1C MFR BLD: 5.8 %
HCT VFR BLD AUTO: 36.1 % (ref 36–48)
HGB BLD-MCNC: 11.9 G/DL (ref 12–16)
MCH RBC QN AUTO: 27.2 PG (ref 26–34)
MCHC RBC AUTO-ENTMCNC: 32.9 G/DL (ref 31–36)
MCV RBC AUTO: 82.8 FL (ref 80–100)
PLATELET # BLD AUTO: 351 K/UL (ref 135–450)
PMV BLD AUTO: 7.5 FL (ref 5–10.5)
RBC # BLD AUTO: 4.36 M/UL (ref 4–5.2)
WBC # BLD AUTO: 4.9 K/UL (ref 4–11)

## 2023-07-19 PROCEDURE — 6370000000 HC RX 637 (ALT 250 FOR IP): Performed by: INTERNAL MEDICINE

## 2023-07-19 PROCEDURE — 36415 COLL VENOUS BLD VENIPUNCTURE: CPT

## 2023-07-19 PROCEDURE — 6360000002 HC RX W HCPCS

## 2023-07-19 PROCEDURE — 99232 SBSQ HOSP IP/OBS MODERATE 35: CPT

## 2023-07-19 PROCEDURE — 83036 HEMOGLOBIN GLYCOSYLATED A1C: CPT

## 2023-07-19 PROCEDURE — 85027 COMPLETE CBC AUTOMATED: CPT

## 2023-07-19 PROCEDURE — 6360000002 HC RX W HCPCS: Performed by: INTERNAL MEDICINE

## 2023-07-19 RX ORDER — KETOROLAC TROMETHAMINE 30 MG/ML
30 INJECTION, SOLUTION INTRAMUSCULAR; INTRAVENOUS ONCE
Status: COMPLETED | OUTPATIENT
Start: 2023-07-19 | End: 2023-07-19

## 2023-07-19 RX ORDER — LISINOPRIL 20 MG/1
30 TABLET ORAL DAILY
Qty: 45 TABLET | Refills: 0 | Status: SHIPPED | OUTPATIENT
Start: 2023-07-19 | End: 2023-08-18

## 2023-07-19 RX ORDER — ATORVASTATIN CALCIUM 80 MG/1
80 TABLET, FILM COATED ORAL NIGHTLY
Qty: 30 TABLET | Refills: 3 | Status: SHIPPED | OUTPATIENT
Start: 2023-07-19

## 2023-07-19 RX ORDER — AMLODIPINE BESYLATE 5 MG/1
5 TABLET ORAL NIGHTLY
Qty: 90 TABLET | Refills: 1 | Status: SHIPPED | OUTPATIENT
Start: 2023-07-19

## 2023-07-19 RX ORDER — ASPIRIN 81 MG/1
81 TABLET ORAL DAILY
Qty: 30 TABLET | Refills: 3 | Status: SHIPPED | OUTPATIENT
Start: 2023-07-20

## 2023-07-19 RX ADMIN — ASPIRIN 81 MG: 81 TABLET, COATED ORAL at 09:05

## 2023-07-19 RX ADMIN — ENOXAPARIN SODIUM 40 MG: 100 INJECTION SUBCUTANEOUS at 09:05

## 2023-07-19 RX ADMIN — ACETAMINOPHEN 650 MG: 325 TABLET ORAL at 05:48

## 2023-07-19 RX ADMIN — KETOROLAC TROMETHAMINE 30 MG: 30 INJECTION, SOLUTION INTRAMUSCULAR; INTRAVENOUS at 09:02

## 2023-07-19 ASSESSMENT — PAIN DESCRIPTION - DESCRIPTORS: DESCRIPTORS: DULL

## 2023-07-19 ASSESSMENT — PAIN DESCRIPTION - ORIENTATION: ORIENTATION: ANTERIOR

## 2023-07-19 ASSESSMENT — PAIN SCALES - GENERAL: PAINLEVEL_OUTOF10: 3

## 2023-07-19 ASSESSMENT — PAIN - FUNCTIONAL ASSESSMENT: PAIN_FUNCTIONAL_ASSESSMENT: PREVENTS OR INTERFERES SOME ACTIVE ACTIVITIES AND ADLS

## 2023-07-19 ASSESSMENT — PAIN DESCRIPTION - LOCATION: LOCATION: HEAD

## 2023-07-19 NOTE — PROGRESS NOTES
4 Eyes Skin Assessment     NAME:  Francisca Lockett  YOB: 1975  MEDICAL RECORD NUMBER:  [id number]    The patient is being assessed for  Admission    I agree that at least one RN has performed a thorough Head to Toe Skin Assessment on the patient. ALL assessment sites listed below have been assessed. Areas assessed by both nurses:    Head, Face, Ears, Shoulders, Back, Chest, Arms, Elbows, Hands, Sacrum. Buttock, Coccyx, Ischium, Legs. Feet and Heels, and Under Medical Devices         Does the Patient have a Wound?  No noted wound(s)       Patricio Prevention initiated by RN: Yes  Wound Care Orders initiated by RN: No    Pressure Injury (Stage 3,4, Unstageable, DTI, NWPT, and Complex wounds) if present, place Wound referral order by RN under : No    New Ostomies, if present place, Ostomy referral order under : No     Nurse 1 eSignature: Electronically signed by Wilfrido Cm RN on 7/18/23 at 3:14 AM EDT    **SHARE this note so that the co-signing nurse can place an eSignature**    Nurse 2 eSignature: Electronically signed by Dede Monteiro RN on 7/18/23 at 4:54 AM EDT
NEUROLOGY / NEUROCRITICAL CARE PROGRESS NOTE       Patient Name: Sandro Espinosa YOB: 1975   Sex: Female Age: 56 yrs     CC / Reason for Consult: Dizziness    Interval Hx / Changes over last 24 hours:   - No acute events over night.   - BP's improving. Pt had better control over night. However she is hypertensive this AM with current BP on monitor reading 165/101 mmHg. - Patient states she has a frontal headache this morning, non-radiating in nature, rated 6/10. Denies any other associated symptoms. No photophobia or hypersensitivity to sound. ROS:   Head - Positive for frontal headache  Eyes - No blurred vision or double vision  Ears - No tinnitus, no ear fullness  Throat - no dysphagia  GI - No nausea or vomiting  Musculoskeletal - no generalized or lateralizing weakness. No gait abnormality  Neurologic - No dizziness, lightheadedness. No syncope. No numbness or paresthesias in any of her extremities. No slurred speech. No    HISTORY   Admission HPI:   Sandro Espinosa is a 56 y.o. y/o female with history significant for hypertension. History provided by: Patient     Sandro Espinosa is a 52yo female who presented to the hospital with headache, dizziness and blurry vision. Patient states that since Friday, she has been feeling dizzy and lightheaded. She reports that she has had symptoms of dizziness in the past but not to this severity and that her symptoms of headache and blurry vision are new. She stated that the room felt like it was spinning comparable to the feeling of alcohol intoxication. She also endorsed having a headache and blurry vision, stating that her eyes felt \"hot\". She noticed these visual symptoms on her drive to [place] from [facility] on Saturday. Patient says that she normally takes lisinopril for her blood pressure and is normally compliant when she has it, but ran out of her medications about 3 months ago.  She denies having any ear pain or ringing, aural fullness,
Occupational Therapy-signed off    OT order received, chart reviewed. Per physical therapist, pt is indep with all ADLs and mobility. There is no need for acute OT. Will sign off for OT, Rn informed of plan.   Robbie Antonio, OTR/L [id number]
Patient is alert and oriented. VSS with elevated BP, managed per MAR. No acute changed noted to patient. NIH 0. Patient is voiding adequately bathroom privilege. Patient ambulated SBA. Standard safety measures in place. Plan of care ongoing.
Pt alert and oriented x4, VSS with exception to hypertension. BP has not been within the parameters to give prn bp meds. Pt went down for echo. IV CDI. Voiding adequately. Fall precautions in place, call light within reach.
Pt is alert and oriented x4. VSS on room air with exception to elevated BP, managed per MAR. Pt denies pain at this time. Fall precautions in place. Call light is within pt's reach. Plan of care is ongoing.
Stroke Admission    I agree as the admission nurse that I have completed a thorough neurologic assessment and completed the admission on the patient. ALL assessment areas listed below have been addressed and completed. Presentation: TIA    Handoff assessment completed with ADRYAN Dee. Current NIHSS 0. [x]   Education Assessment  [x]   Education template added (STROKE/TIA), selecting ONLY patient specific risk factors: Hypertension, High Cholesterol, Smoking, Overweight, Lack of exercise, Excessive use of alcohol, Use of illicit drugs, and Family history of heart disease  [x]   Care Plan template added (Physiologic Instability - Neurosensory). Selecting this will add the care plan rows to the flow sheet under the Neuro section of Head to Toe. [x]   Bedside swallow screen completed using the [facility] Protocol, and documented PRIOR to any PO meds, food or drink: Pass  [x]   VTE Prophylaxis: SCDs ordered/addressed; SCDs: N/A Warfarin           (As a reminder, ASA, Plavix and TPA/TNK are not VTE prophylaxis.)  [x]   Stroke education booklet given, and education initiated with patient and/or caregiver.       Nurse eSignature: Electronically signed by Solo Shepard RN on 7/18/23 at 3:15 AM EDT
V2.0    [facility] Progress Note      Name:  Kaylynn Alonso /Age/Sex: 1975  (56 y.o. female)   MRN & CSN:  [id number] & [id number] Encounter Date/Time: 2023 9:10 AM EDT   [facility]:  [facility] PCP: No primary care provider on file. Attending:Art Hernandez, [address] [address] Day: 2    Assessment and Recommendations   Kaylynn Alonso is a 56 y.o. female with pmh of HTN who presents with Hypertensive urgency, malignant    #Severe uncontrolled HTN in setting of medication non-adherence  #Vertigo  - Review of labs and physical exam so far shows no signs of end-organ damage  - Neurology consulted  - F/u MR brain: start oral anti-hypertensives if no CVA  - F/u TTE  - IV anti-hypertensives as needed  - checking UA for proteinuria  - F/u FLP and A1c  - PT/OT    Diet ADULT DIET; Regular   DVT Prophylaxis [] Lovenox, []  Heparin, [] SCDs, [] Ambulation,  [] Eliquis, [] Xarelto  [] Coumadin   Code Status Full Code   Disposition From: Home  Expected Disposition: Home  Estimated Date of Discharge: 2023  Patient requires continued admission due to BP control, neuro eval   Surrogate Decision Maker/ POA  Kim Dials, child     Personally reviewed Lab Studies and Imaging       Subjective:     Chief Complaint: Hosseintigo    Kaylynn Alonso is a 56 y.o. female w/ HTN who presented complaining of headache and vertigo sensation in setting of not taking antihypertensives for months. Concern for hypertensive emergency with BP 180s/100s. Admitted for further treatment. No further clinical changes reported post-admission. BP improving and now 150s/100s. Currently patient feels okay no new symptomatic complaints. Review of Systems:      Pertinent positives and negatives discussed in HPI    Objective:      Intake/Output Summary (Last 24 hours) at 2023 0910  Last data filed at 2023 0600  Gross per 24 hour   Intake 240 ml   Output --   Net 240 ml      Vitals:   Vitals:    23 2100 23 2115 23 0130 23
Yes  Patient assessed for rehabilitation services?: Yes  Additional Pertinent Hx: Patient is a 57 y/o female admitted 7/17 with dizziness and hypertension. Chest x-ray and CT head (-) for acute findings. Response To Previous Treatment: Not applicable  Family / Caregiver Present: No  Referring Practitioner: Didi Sagastume MD  Referral Date : 07/18/23  Diagnosis: vertigo  Follows Commands: Within Functional Limits  General Comment  Comments: Patient supine in bed upon arrival.  Subjective  Subjective: Patient agreeable to PT evaluation, denies pain.          Social/Functional History  Social/Functional History  Lives With: Daughter (and grandson (11years old))  Type of Home: Apartment (1st floor)  Home Layout: One level  Home Access: Stairs to enter with rails  Entrance Stairs - Number of Steps: 4  Bathroom Shower/Tub: Tub/Shower unit  Bathroom Toilet: Standard  Bathroom Equipment:  (none)  Home Equipment:  (none)  Has the patient had two or more falls in the past year or any fall with injury in the past year?: No  ADL Assistance: [address] Independent (shares with daughter)  Homemaking Responsibilities: Yes  Ambulation Assistance: Independent  Transfer Assistance: Independent  Active : Yes  Occupation: Full time employment  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  Vision/Hearing  Vision  Vision: Within Functional Limits  Hearing  Hearing: Within functional limits    Cognition   Orientation  Overall Orientation Status: Within Normal Limits  Cognition  Overall Cognitive Status: WNL     Objective       AROM RLE (degrees)  RLE AROM: WFL  AROM LLE (degrees)  LLE AROM : WFL  Strength RLE  Strength RLE: WFL  Strength LLE  Strength LLE: WFL           Bed mobility  Supine to Sit: Independent  Sit to Supine: Independent  Transfers  Sit to Stand: Independent (from EOB and from toilet)  Stand to Sit: Independent (to toilet and to EOB)  Ambulation  Surface: Level tile  Device: No

## 2023-07-19 NOTE — PLAN OF CARE
Problem: Discharge Planning  Goal: Discharge to home or other facility with appropriate resources  7/19/2023 1451 by Shalini Mondragon, RN  Note: Pt to rest , a few days , appointment with clinic , prior to going back to work , work note from out pt clinic , to car per wheel chair, meds to beds delivered to pt ,

## 2023-07-19 NOTE — PLAN OF CARE
Problem: Discharge Planning  Goal: Discharge to home or other facility with appropriate resources  7/19/2023 0334 by Pj Latif RN  Outcome: Progressing  Flowsheets (Taken 7/19/2023 0334)  Discharge to home or other facility with appropriate resources:   Arrange for needed discharge resources and transportation as appropriate   Identify barriers to discharge with patient and caregiver     Problem: Safety - Adult  Goal: Free from fall injury  7/19/2023 0334 by Pj Latif RN  Outcome: Progressing  Flowsheets (Taken 7/19/2023 0334)  Free From Fall Injury:   Instruct family/caregiver on patient safety   Based on caregiver fall risk screen, instruct family/caregiver to ask for assistance with transferring infant if caregiver noted to have fall risk factors     Problem: Pain  Goal: Verbalizes/displays adequate comfort level or baseline comfort level  7/19/2023 0334 by Pj Latif RN  Outcome: Progressing  Flowsheets (Taken 7/19/2023 0334)  Verbalizes/displays adequate comfort level or baseline comfort level:   Encourage patient to monitor pain and request assistance   Assess pain using appropriate pain scale     Problem: Neurosensory - Adult  Goal: Achieves stable or improved neurological status  Outcome: Progressing  Flowsheets (Taken 7/19/2023 0334)  Achieves stable or improved neurological status:   Assess for and report changes in neurological status   Initiate measures to prevent increased intracranial pressure

## 2023-07-19 NOTE — PLAN OF CARE
Problem: Pain  Goal: Verbalizes/displays adequate comfort level or baseline comfort level  7/19/2023 1450 by Cl Heredia, RN  Note: Head feeling better after toradol , up in room , ambulation , gait staedy , plan dc home today , moves all extremities , no numbness tinging , weakness  plan dc today     7/19/2023 0334 by Devorah Rebolledo RN  Outcome: Progressing  Flowsheets (Taken 7/19/2023 0334)  Verbalizes/displays adequate comfort level or baseline comfort level:   Encourage patient to monitor pain and request assistance   Assess pain using appropriate pain scale

## 2023-07-24 ENCOUNTER — OFFICE VISIT (OUTPATIENT)
Dept: INTERNAL MEDICINE CLINIC | Age: 48
End: 2023-07-24

## 2023-07-24 VITALS
HEART RATE: 65 BPM | DIASTOLIC BLOOD PRESSURE: 85 MMHG | HEIGHT: 62 IN | SYSTOLIC BLOOD PRESSURE: 120 MMHG | BODY MASS INDEX: 32.07 KG/M2 | WEIGHT: 174.3 LBS | OXYGEN SATURATION: 97 % | TEMPERATURE: 97.8 F

## 2023-07-24 DIAGNOSIS — Z00.00 HEALTHCARE MAINTENANCE: Primary | ICD-10-CM

## 2023-07-24 DIAGNOSIS — I10 UNCONTROLLED HYPERTENSION: ICD-10-CM

## 2023-07-24 PROCEDURE — 99213 OFFICE O/P EST LOW 20 MIN: CPT

## 2023-07-24 ASSESSMENT — ENCOUNTER SYMPTOMS
GASTROINTESTINAL NEGATIVE: 1
SHORTNESS OF BREATH: 1
CHEST TIGHTNESS: 1
ALLERGIC/IMMUNOLOGIC NEGATIVE: 1

## 2023-07-24 NOTE — PATIENT INSTRUCTIONS
Please comeback for the regular follow-up visit in 3 month  Please get your following blood work before your next visit. -TSH with Reflex  Please continue to take your medications as prescribed. Please eat green vegetable and avoid fast-foot.    Please exercise 30 mins at least 3 times every week

## 2023-07-24 NOTE — PROGRESS NOTES
[facility] Outpatient [facility] Medicine [facility]    Claudia James is a 56 y.o. female, here for evaluation of the following concerns:   [facility] admission follow-up    Presenting complaint: Fatigue    She has a past medical history of hypertension, and hyperlipidemia    Patient presented for the posthospital admission follow-up visit. She does not have any active complain or any acute distress right now. However she did mention she get fatigued a little bit which has improved significantly since her admission in the hospital.      She usually checks her blood pressure daily at home and she usually runs in 120s/80s. On review of systems she reported that she gets palpitations, shortness of breath and anxiety sometimes as well. She also reported that she is heat intolerant and she also mentioned that she cannot menopause last year. 07/17/2020: Hospital admission for dizziness and hypertensive urgency due to noncompliance with medication. MRI and MRV and CT negative, echo with EF 55 to 60%, normal LV size and thickness. Discharged on lisinopril and amlodipine      Review of Systems   Constitutional:  Positive for fatigue. Negative for activity change and unexpected weight change. HENT: Negative. Respiratory:  Positive for chest tightness and shortness of breath (Occasional). Cardiovascular:  Positive for palpitations. Negative for chest pain and leg swelling. Gastrointestinal: Negative. Genitourinary: Negative. Musculoskeletal:  Negative for arthralgias. Skin: Negative. Allergic/Immunologic: Negative. Neurological: Negative. Hematological: Negative. Psychiatric/Behavioral: Negative. Health Maintenance    COVID Vaccine: S/p 2 shots. Needs Boaster  Influenza Vaccine (Yearly): Needs one now  Tdap (q10 yrs): Not up to date      MEDICATIONS:  Prior to Visit Medications    Medication Sig Taking?  Authorizing Provider   aspirin 81 MG EC tablet Take 1 tablet by mouth

## 2023-11-01 RX ORDER — LISINOPRIL 20 MG/1
30 TABLET ORAL DAILY
Qty: 45 TABLET | Refills: 0 | Status: SHIPPED | OUTPATIENT
Start: 2023-11-01 | End: 2023-12-01

## 2023-11-01 NOTE — TELEPHONE ENCOUNTER
Requested Prescriptions     Pending Prescriptions Disp Refills    lisinopril (PRINIVIL;ZESTRIL) 20 MG tablet 45 tablet 0     Sig: Take 1.5 tablets by mouth daily       Last Clinic Visit:  7/24/2023     Next Clinic Appointment:  11/6/2023

## 2023-11-01 NOTE — TELEPHONE ENCOUNTER
PT STATED SHE NEED REFILL ON LISINOPRIL. PER PT PLEASE SEND TO JAQUI SOTO IN [facility] [phone]

## 2024-02-15 ENCOUNTER — OFFICE VISIT (OUTPATIENT)
Dept: INTERNAL MEDICINE CLINIC | Age: 49
End: 2024-02-15

## 2024-02-15 VITALS
DIASTOLIC BLOOD PRESSURE: 111 MMHG | BODY MASS INDEX: 31.88 KG/M2 | TEMPERATURE: 98 F | HEART RATE: 83 BPM | WEIGHT: 174.3 LBS | SYSTOLIC BLOOD PRESSURE: 166 MMHG | RESPIRATION RATE: 16 BRPM | OXYGEN SATURATION: 100 %

## 2024-02-15 DIAGNOSIS — I10 UNCONTROLLED HYPERTENSION: Primary | ICD-10-CM

## 2024-02-15 DIAGNOSIS — R42 VERTIGO: ICD-10-CM

## 2024-02-15 LAB — HBA1C MFR BLD: 6.2 %

## 2024-02-15 PROCEDURE — 99213 OFFICE O/P EST LOW 20 MIN: CPT

## 2024-02-15 PROCEDURE — 83036 HEMOGLOBIN GLYCOSYLATED A1C: CPT

## 2024-02-15 RX ORDER — LISINOPRIL 20 MG/1
30 TABLET ORAL DAILY
Qty: 45 TABLET | Refills: 2 | Status: CANCELLED | OUTPATIENT
Start: 2024-02-15 | End: 2024-05-15

## 2024-02-15 RX ORDER — AMLODIPINE BESYLATE 5 MG/1
5 TABLET ORAL NIGHTLY
Qty: 90 TABLET | Refills: 2 | Status: CANCELLED | OUTPATIENT
Start: 2024-02-15

## 2024-02-15 RX ORDER — AMLODIPINE BESYLATE 5 MG/1
5 TABLET ORAL NIGHTLY
Qty: 90 TABLET | Refills: 1 | Status: SHIPPED | OUTPATIENT
Start: 2024-02-15

## 2024-02-15 RX ORDER — LOSARTAN POTASSIUM AND HYDROCHLOROTHIAZIDE 12.5; 5 MG/1; MG/1
1 TABLET ORAL DAILY
Qty: 30 TABLET | Refills: 3 | Status: SHIPPED | OUTPATIENT
Start: 2024-02-15

## 2024-02-15 RX ORDER — METRONIDAZOLE 500 MG/1
500 TABLET ORAL 3 TIMES DAILY
Qty: 30 TABLET | Refills: 0 | Status: SHIPPED | OUTPATIENT
Start: 2024-02-15 | End: 2024-02-25

## 2024-02-15 RX ORDER — ROSUVASTATIN CALCIUM 10 MG/1
10 TABLET, COATED ORAL DAILY
Qty: 90 TABLET | Refills: 2 | Status: SHIPPED | OUTPATIENT
Start: 2024-02-15

## 2024-02-15 NOTE — PROGRESS NOTES
[facility] Outpatient Internal Medicine [facility]    Ms. Eli Pérez is a 48 y.o. female, with a medical history significant for uncontrolled hypertension and perimenopausal state, who presents to the clinic for concerns of increased frequency of dizziness.    Ms. Pérez reports a 3-year history of dizziness, which has been increasing in frequency over the past couple of weeks, with concurrent lightheadedness, which she describes as a sensation as though she was standing up too quickly; however, it is actually unrelated to position changes or a particular time of the day.  The episodes will last for more than a few minutes w/o any identifiable alleviating factors.  Some episodes are accompanied by palpitations, which she attributes to being anxious about the dizziness.  She also noticed that her vision in general seems to be more blurry and makes her feel unsteady on her feet, but does not necessarily coincide with the episodes.  She has not checked her BP during any of the recent episodes.  She reports being diaphoretic during some of the episodes; however, she correlates it with being perimenopausal.  She notes she was hospitalized in July 2023 for similar symptoms, at which point she was ultimately diagnosed with vertigo 2/2 hypertension.  She denies concurrent ear fullness/pressure/pain, tinnitus, neck pain, dysarthria, nausea, vomiting, chest pain/pressure/tightness, weakness, focal neuro deficits, numbness, confusion, or any episodes of syncope; denies recent illness, sick contacts.    She states that even when she is consistently taking the lisinopril and amlodipine, her BP remains elevated at 150s/100s.  She consumes 1 can of Monster ~once daily; sometimes every other day.  She currently works as a  from 7:00am-3:30pm and has noticed that she has been feeling drained of energy more recently.  She gets ~5 to 6 hours of uninterrupted sleep each night; has been noted to snore; denies daytime

## 2024-02-15 NOTE — PATIENT INSTRUCTIONS
As discussed during your visit, please:    Stop taking lisinopril and atorvastatin (Lipitor).    Please speak with the pharmacist at [facility] about joining their medication savings program.    Start taking the following:  Losartan-hydrochlorothiazide (Hyzaar)  Rosuvastatin (Crestor)    Please take your blood pressure at home each morning and keep a record to bring to your next visit.  For at least 30 minutes before you take your blood pressure, don't exercise, drink caffeine, or smoke.   Empty your bladder before the test.   Sit quietly with your back straight and both feet on the floor for at least 5 minutes. This helps you take your blood pressure while you feel comfortable and relaxed.    Please return in 2 weeks for follow-up on your blood pressure.    Please call the clinic if you have any questions or concerns, [phone].

## 2024-07-24 ENCOUNTER — APPOINTMENT (OUTPATIENT)
Dept: GENERAL RADIOLOGY | Age: 49
End: 2024-07-24
Payer: COMMERCIAL

## 2024-07-24 ENCOUNTER — HOSPITAL ENCOUNTER (EMERGENCY)
Age: 49
Discharge: HOME | End: 2024-07-24
Attending: STUDENT IN AN ORGANIZED HEALTH CARE EDUCATION/TRAINING PROGRAM
Payer: COMMERCIAL

## 2024-07-24 VITALS
TEMPERATURE: 98.3 F | SYSTOLIC BLOOD PRESSURE: 188 MMHG | BODY MASS INDEX: 31.72 KG/M2 | WEIGHT: 179.01 LBS | OXYGEN SATURATION: 100 % | HEIGHT: 63 IN | RESPIRATION RATE: 18 BRPM | DIASTOLIC BLOOD PRESSURE: 111 MMHG | HEART RATE: 72 BPM

## 2024-07-24 DIAGNOSIS — M25.512 CHRONIC LEFT SHOULDER PAIN: Primary | ICD-10-CM

## 2024-07-24 DIAGNOSIS — G89.29 CHRONIC LEFT SHOULDER PAIN: Primary | ICD-10-CM

## 2024-07-24 PROCEDURE — 73030 X-RAY EXAM OF SHOULDER: CPT

## 2024-07-24 PROCEDURE — 6370000000 HC RX 637 (ALT 250 FOR IP): Performed by: STUDENT IN AN ORGANIZED HEALTH CARE EDUCATION/TRAINING PROGRAM

## 2024-07-24 PROCEDURE — 99283 EMERGENCY DEPT VISIT LOW MDM: CPT

## 2024-07-24 RX ORDER — ACETAMINOPHEN 500 MG
1000 TABLET ORAL
Status: COMPLETED | OUTPATIENT
Start: 2024-07-24 | End: 2024-07-24

## 2024-07-24 RX ORDER — LIDOCAINE 4 G/G
1 PATCH TOPICAL DAILY
Status: DISCONTINUED | OUTPATIENT
Start: 2024-07-24 | End: 2024-07-24 | Stop reason: HOSPADM

## 2024-07-24 RX ADMIN — IBUPROFEN 600 MG: 200 TABLET, FILM COATED ORAL at 17:42

## 2024-07-24 RX ADMIN — ACETAMINOPHEN 1000 MG: 500 TABLET ORAL at 17:42

## 2024-07-24 NOTE — ED NOTES
[facility] Emergency Department  MEDICAL SCREENING EXAM    Date of Service: 7/24/2024    Reason for Visit: No chief complaint on file.        Patient History, Brief Exam, and Initial Assessment     Abbreviated HPI: Eli Pérez is a 48 y.o. female presenting with left shoulder pain ongoing for several years but worsening over the past few days.  She reports that she has never had surgery, prior injuries and does not have a orthopedic doctor at this time.  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    INITIAL VITALS:  ,  ,  ,  ,      Plan     Patient was evaluated in the REU for a medical screening exam.  To further evaluate the presenting complaints, the following orders have been placed:  No orders of the defined types were placed in this encounter.       See primary provider's note for full details and final disposition.     Current Facility-Administered Medications:   No orders of the defined types were placed in this encounter.        REU Dispo     Stable for lobby while awaiting ED bed      Relevant Medical History     Past Medical History:   Diagnosis Date    Hypertension      No past surgical history on file.  Allergies   Allergen Reactions    Lipitor [Atorvastatin] Diarrhea     Diarrhea also on re-challenge of the medication          Sagar Castillo PA-C  07/24/24 2678

## 2024-07-24 NOTE — ED PROVIDER NOTES
[facility]  EMERGENCY DEPARTMENT ENCOUNTER          ATTENDING PHYSICIAN NOTE       Date of evaluation: 7/24/2024    Chief Complaint     Shoulder Pain (Pt complains of left shoulder pain. Pt has sciatica but for the last 3 days the pain has been unbearable. )      History of Present Illness     Eli Pérez is a 48 y.o. female who presents with past medical history of sciatica presenting with left shoulder pain.  She has had the pain for several weeks but over the last few days it has become worse.  She does have a history of sciatica and was worried that this might be related.  She otherwise feels well and denies fevers or chills, nausea or vomiting, chest pain, shortness of breath, or abdominal pain    ASSESSMENT / PLAN  (MEDICAL DECISION MAKING)     INITIAL VITALS: BP: (!) 188/111, Temp: 98.3 °F (36.8 °C), Pulse: 72, Respirations: 18, SpO2: 100 %      Eli Pérez is a 48 y.o. female presenting with several weeks of left shoulder pain.    Patient did have a left shoulder x-ray obtained from triage.  X-ray does not show any acute bony abnormality aside from arthritis/arthropathy.  On examination, patient does have tenderness to palpation of her trapezius muscle medial to her left shoulder blade.  I suspect her pain is consistent with arthritis rather than any neurological etiology.  Will treat with Tylenol, ibuprofen, and lidocaine patch.    Patient given referral to PT for her shoulder pain.  Additionally, patient reports a wart on her finger for which she would like a referral to dermatology.  Patient provided referral to dermatology for this as well.  Discharged home with strict return precautions and instructed to return to the emergency department should her pain radiate to her chest or abdomen, with fevers or chills, or nausea or vomiting.  Encouraged her to take Tylenol and ibuprofen as needed at home for pain    Is this patient to be included in the SEP-1 core measure? No Exclusion criteria - the

## 2025-04-04 ENCOUNTER — APPOINTMENT (OUTPATIENT)
Dept: CT IMAGING | Age: 50
End: 2025-04-04
Payer: COMMERCIAL

## 2025-04-04 ENCOUNTER — HOSPITAL ENCOUNTER (EMERGENCY)
Age: 50
Discharge: HOME | End: 2025-04-04
Payer: COMMERCIAL

## 2025-04-04 VITALS
DIASTOLIC BLOOD PRESSURE: 108 MMHG | OXYGEN SATURATION: 100 % | HEART RATE: 74 BPM | BODY MASS INDEX: 32.58 KG/M2 | TEMPERATURE: 98.1 F | SYSTOLIC BLOOD PRESSURE: 178 MMHG | WEIGHT: 177.03 LBS | RESPIRATION RATE: 16 BRPM | HEIGHT: 62 IN

## 2025-04-04 DIAGNOSIS — Z91.148 NONCOMPLIANCE WITH MEDICATION REGIMEN: ICD-10-CM

## 2025-04-04 DIAGNOSIS — I10 ELEVATED BLOOD PRESSURE READING WITH DIAGNOSIS OF HYPERTENSION: Primary | ICD-10-CM

## 2025-04-04 DIAGNOSIS — D36.9 DERMOID CYST: ICD-10-CM

## 2025-04-04 LAB
ANION GAP SERPL CALCULATED.3IONS-SCNC: 9 MMOL/L (ref 3–16)
BASOPHILS # BLD: 0 K/UL (ref 0–0.2)
BASOPHILS NFR BLD: 0.5 %
BILIRUB UR QL STRIP.AUTO: NEGATIVE
BUN SERPL-MCNC: 11 MG/DL (ref 7–20)
CALCIUM SERPL-MCNC: 9.1 MG/DL (ref 8.3–10.6)
CHLORIDE SERPL-SCNC: 106 MMOL/L (ref 99–110)
CLARITY UR: CLEAR
CO2 SERPL-SCNC: 26 MMOL/L (ref 21–32)
COLOR UR: YELLOW
CREAT SERPL-MCNC: 0.6 MG/DL (ref 0.6–1.1)
DEPRECATED RDW RBC AUTO: 16.3 % (ref 12.4–15.4)
EOSINOPHIL # BLD: 0.1 K/UL (ref 0–0.6)
EOSINOPHIL NFR BLD: 2 %
GFR SERPLBLD CREATININE-BSD FMLA CKD-EPI: >90 ML/MIN/{1.73_M2}
GLUCOSE SERPL-MCNC: 88 MG/DL (ref 70–99)
GLUCOSE UR STRIP.AUTO-MCNC: NEGATIVE MG/DL
HCG UR QL: NEGATIVE
HCT VFR BLD AUTO: 36 % (ref 36–48)
HGB BLD-MCNC: 11.9 G/DL (ref 12–16)
HGB UR QL STRIP.AUTO: NEGATIVE
KETONES UR STRIP.AUTO-MCNC: NEGATIVE MG/DL
LEUKOCYTE ESTERASE UR QL STRIP.AUTO: NEGATIVE
LYMPHOCYTES # BLD: 1.7 K/UL (ref 1–5.1)
LYMPHOCYTES NFR BLD: 33.6 %
MCH RBC QN AUTO: 27.8 PG (ref 26–34)
MCHC RBC AUTO-ENTMCNC: 33 G/DL (ref 31–36)
MCV RBC AUTO: 84.2 FL (ref 80–100)
MONOCYTES # BLD: 0.3 K/UL (ref 0–1.3)
MONOCYTES NFR BLD: 6.4 %
NEUTROPHILS # BLD: 3 K/UL (ref 1.7–7.7)
NEUTROPHILS NFR BLD: 57.5 %
NITRITE UR QL STRIP.AUTO: NEGATIVE
PH UR STRIP.AUTO: 7.5 [PH] (ref 5–8)
PLATELET # BLD AUTO: 391 K/UL (ref 135–450)
PMV BLD AUTO: 7.9 FL (ref 5–10.5)
POTASSIUM SERPL-SCNC: 3.8 MMOL/L (ref 3.5–5.1)
PROT UR STRIP.AUTO-MCNC: NEGATIVE MG/DL
RBC # BLD AUTO: 4.27 M/UL (ref 4–5.2)
SODIUM SERPL-SCNC: 141 MMOL/L (ref 136–145)
SP GR UR STRIP.AUTO: 1.01 (ref 1–1.03)
UA COMPLETE W REFLEX CULTURE PNL UR: NORMAL
UA DIPSTICK W REFLEX MICRO PNL UR: NORMAL
URN SPEC COLLECT METH UR: NORMAL
UROBILINOGEN UR STRIP-ACNC: 1 E.U./DL
WBC # BLD AUTO: 5.1 K/UL (ref 4–11)

## 2025-04-04 PROCEDURE — 6370000000 HC RX 637 (ALT 250 FOR IP): Performed by: PHYSICIAN ASSISTANT

## 2025-04-04 PROCEDURE — 6360000004 HC RX CONTRAST MEDICATION: Performed by: PHYSICIAN ASSISTANT

## 2025-04-04 PROCEDURE — 81003 URINALYSIS AUTO W/O SCOPE: CPT

## 2025-04-04 PROCEDURE — 85025 COMPLETE CBC W/AUTO DIFF WBC: CPT

## 2025-04-04 PROCEDURE — 80048 BASIC METABOLIC PNL TOTAL CA: CPT

## 2025-04-04 PROCEDURE — 74177 CT ABD & PELVIS W/CONTRAST: CPT

## 2025-04-04 PROCEDURE — 84703 CHORIONIC GONADOTROPIN ASSAY: CPT

## 2025-04-04 PROCEDURE — 99285 EMERGENCY DEPT VISIT HI MDM: CPT

## 2025-04-04 RX ORDER — IOPAMIDOL 755 MG/ML
75 INJECTION, SOLUTION INTRAVASCULAR
Status: COMPLETED | OUTPATIENT
Start: 2025-04-04 | End: 2025-04-04

## 2025-04-04 RX ORDER — ACETAMINOPHEN 500 MG
1000 TABLET ORAL ONCE
Status: COMPLETED | OUTPATIENT
Start: 2025-04-04 | End: 2025-04-04

## 2025-04-04 RX ORDER — LISINOPRIL 10 MG/1
10 TABLET ORAL DAILY
Qty: 30 TABLET | Refills: 3 | Status: SHIPPED | OUTPATIENT
Start: 2025-04-04

## 2025-04-04 RX ORDER — AMLODIPINE BESYLATE 5 MG/1
5 TABLET ORAL DAILY
Status: DISCONTINUED | OUTPATIENT
Start: 2025-04-04 | End: 2025-04-04 | Stop reason: HOSPADM

## 2025-04-04 RX ADMIN — ACETAMINOPHEN 1000 MG: 500 TABLET ORAL at 10:57

## 2025-04-04 RX ADMIN — AMLODIPINE BESYLATE 5 MG: 5 TABLET ORAL at 11:43

## 2025-04-04 RX ADMIN — IOPAMIDOL 75 ML: 755 INJECTION, SOLUTION INTRAVENOUS at 12:28

## 2025-04-04 ASSESSMENT — PAIN - FUNCTIONAL ASSESSMENT
PAIN_FUNCTIONAL_ASSESSMENT: NONE - DENIES PAIN
PAIN_FUNCTIONAL_ASSESSMENT: ACTIVITIES ARE NOT PREVENTED
PAIN_FUNCTIONAL_ASSESSMENT: 0-10

## 2025-04-04 ASSESSMENT — PAIN DESCRIPTION - FREQUENCY: FREQUENCY: CONTINUOUS

## 2025-04-04 ASSESSMENT — PAIN DESCRIPTION - LOCATION: LOCATION: ABDOMEN

## 2025-04-04 ASSESSMENT — PAIN SCALES - GENERAL
PAINLEVEL_OUTOF10: 4
PAINLEVEL_OUTOF10: 4

## 2025-04-04 ASSESSMENT — LIFESTYLE VARIABLES
HOW OFTEN DO YOU HAVE A DRINK CONTAINING ALCOHOL: NEVER
HOW MANY STANDARD DRINKS CONTAINING ALCOHOL DO YOU HAVE ON A TYPICAL DAY: PATIENT DOES NOT DRINK

## 2025-04-04 ASSESSMENT — PAIN DESCRIPTION - DESCRIPTORS: DESCRIPTORS: CRAMPING

## 2025-04-04 ASSESSMENT — PAIN DESCRIPTION - ORIENTATION: ORIENTATION: LEFT

## 2025-04-04 ASSESSMENT — PAIN DESCRIPTION - PAIN TYPE: TYPE: ACUTE PAIN

## 2025-04-04 NOTE — ED PROVIDER NOTES
**ADVANCED PRACTICE PROVIDER, I HAVE EVALUATED THIS PATIENT**        [facility] EMERGENCY DEPARTMENT  EMERGENCY DEPARTMENT ENCOUNTER      Pt Name: Eli Pérez  MRN:[id number]  Birthdate 1975  Date of evaluation: 4/4/2025  Provider: Lio Craft PA-C  Note Started: 2:12 PM EDT 4/4/25        Chief Complaint:    Chief Complaint   Patient presents with    Hypertension     Pt states that she has been experiencing lightheaded, blurred vision, off balance, and nausea for the past 3 days. Pt states that she started to experience left lower abdominal pain yesterday. Pt denies vomiting. Pt endorses chills without fever. Pt states that she has not taken any BP medications for the past month.  Pt AAO x 4. VSS.          Nursing Notes, Past Medical Hx, Past Surgical Hx, Social Hx, Allergies, and Family Hx were all reviewed and agreed with or any disagreements were addressed in the HPI.    HPI: (Location, Duration, Timing, Severity, Quality, Assoc Sx, Context, Modifying factors)    History From: ***  {Limitations to history (Optional):01817}    {Social Determinants Significantly Affecting Health (Optional):92238}    Chief Complaint of ***    This is a  49 y.o. female who presents  ***    PastMedical/Surgical History:      Diagnosis Date    Hypertension      History reviewed. No pertinent surgical history.    Medications:  Previous Medications    AMLODIPINE (NORVASC) 5 MG TABLET    Take 1 tablet by mouth at bedtime    ASPIRIN 81 MG EC TABLET    Take 1 tablet by mouth daily    LOSARTAN-HYDROCHLOROTHIAZIDE (HYZAAR) 50-12.5 MG PER TABLET    Take 1 tablet by mouth daily    ROSUVASTATIN (CRESTOR) 10 MG TABLET    Take 1 tablet by mouth daily       Review of Systems:  Review of Systems    \"Positives and Pertinent negatives as per HPI\"    Physical Exam:  Physical Exam    MEDICAL DECISION MAKING    Vitals:    Vitals:    04/04/25 1021 04/04/25 1143 04/04/25 1422   BP: (!) 166/105 (!) 166/105 (!) 178/108   Pulse: 67  74

## 2025-04-04 NOTE — ED TRIAGE NOTES
Pt states that she has been experiencing lightheaded, blurred vision, off balance, and nausea for the past 3 days. Pt states that she started to experience left lower abdominal pain yesterday. Pt denies vomiting. Pt endorses chills without fever. Pt states that she has not taken any BP medications for the past month.  Pt AAO x 4. VSS.

## 2025-04-04 NOTE — DISCHARGE INSTRUCTIONS
Take prescribed medication as prescribed only  Get established with a primary care provider  Return emergency room for any worsening    [facility] Referral number [phone] for Primary Care      [facility] CLINICS/COMM[facility]ENTERS  [facility]  [address] [address] [phone]  Fax [phone]  Medical, OB/Gyn, Pediatrics, [facility]  Serves all of [facility])  [address]  [place], [place] [address]  [phone]       [facility]  [address] [facility] [address] [facility] (Administrative offices)  [phone]  Homeless only [facility] ([place])  [address], [place] [address]  [phone] or [phone], [place] [phone],   Dental Appointments [phone] or [phone]  Pediatric, Family Practice, X-Ray  Serves all of [facility])  [address]  [place], [place] [address]  [phone]   [facility] [facility]   [address] [facility]    (Located in [facility])  [phone] or [phone], [place] [phone],   Dental Appointments [phone] or [phone]     [facility]  [address] [place], [place] [address]  [phone] [facility]  [address] [facility] [address]  [phone]   [facility]  [address]  [id number] [phone] Fax [phone]  General Practice    Serves [place] and Surrounding areas [facility]  [address] [facility] [address]  [phone] Fax [phone]  Medical, OB/Gyn, Pediatrics  Dental Clinic, [facility] limits only     [facility]  [address] [facility] [address]  [phone] Fax [phone]  Family Practice, Pediatrics, OB/Gyn, [facility]  Dental Clinic [phone]  Serves [facility]  [address]    [phone]  Urgent Care, Open 24 hrs, Urgent care, Gyn, Prenatal, Dental Mental, Translators     Health  [place] [address] [place] [address] [phone]  (Located: [facility])  Pediatrics [phone], [place] [phone],   Dental Appointments [phone] or [phone]  [facility] [phone] [facility]  [address] [address]  [phone]  Medical, OB/Gyn, Pediatrics, [facility]  Dental Clinic [phone]       [facility]  [address] [place]  [address]  [phone] Fax [phone]  Pediatrics, [facility]  [address] [address]  [phone]   Fax [phone]  Pediatrics, [facility]  [address] [address]  [phone]  Medical [address] [address]  [phone]  Pediatrics, Internal Med, OB/Gyn, [facility], Dental Clinic [phone]     [facility]  [address] [facility] [address]   [phone] [address] [address]  [phone] Fax [phone]  General Medical Clinic  Sliding scale fee  All [facility]  [address] [place], [place] [address]  [phone]     [facility]  [address] [place] [address]  [phone]    [facility]   [address] [facility] [address], [place] [address]  [phone]         [facility]  [address] [place], [place] [address]  [facility]  [phone]  Fax:  [phone]  [facility]  [phone]  Fax:  [phone]  General Medical Clinic  Resident Practice  [phone]  Fax:  [phone]  Medical Specialty Center  [phone]  Fax:  [phone]  Orthopaedics  [phone]     [facility] ([facility] [place])  [address] [address]  [phone]    [place] (Continued)    [facility]   ([facility])  8012

## 2025-04-07 ASSESSMENT — ENCOUNTER SYMPTOMS
SHORTNESS OF BREATH: 0
BLOOD IN STOOL: 0
CONSTIPATION: 0
SORE THROAT: 0
ABDOMINAL PAIN: 1
BACK PAIN: 0
VOMITING: 0
EYE PAIN: 0
DIARRHEA: 0
NAUSEA: 0
COUGH: 0

## 2025-06-19 SDOH — ECONOMIC STABILITY: FOOD INSECURITY: WITHIN THE PAST 12 MONTHS, YOU WORRIED THAT YOUR FOOD WOULD RUN OUT BEFORE YOU GOT MONEY TO BUY MORE.: OFTEN TRUE

## 2025-06-19 SDOH — ECONOMIC STABILITY: INCOME INSECURITY: IN THE LAST 12 MONTHS, WAS THERE A TIME WHEN YOU WERE NOT ABLE TO PAY THE MORTGAGE OR RENT ON TIME?: YES

## 2025-06-19 SDOH — ECONOMIC STABILITY: FOOD INSECURITY: WITHIN THE PAST 12 MONTHS, THE FOOD YOU BOUGHT JUST DIDN'T LAST AND YOU DIDN'T HAVE MONEY TO GET MORE.: SOMETIMES TRUE

## 2025-06-19 SDOH — ECONOMIC STABILITY: TRANSPORTATION INSECURITY
IN THE PAST 12 MONTHS, HAS THE LACK OF TRANSPORTATION KEPT YOU FROM MEDICAL APPOINTMENTS OR FROM GETTING MEDICATIONS?: NO

## 2025-06-19 SDOH — ECONOMIC STABILITY: TRANSPORTATION INSECURITY
IN THE PAST 12 MONTHS, HAS LACK OF TRANSPORTATION KEPT YOU FROM MEETINGS, WORK, OR FROM GETTING THINGS NEEDED FOR DAILY LIVING?: NO

## 2025-06-20 ENCOUNTER — OFFICE VISIT (OUTPATIENT)
Dept: INTERNAL MEDICINE CLINIC | Age: 50
End: 2025-06-20
Payer: COMMERCIAL

## 2025-06-20 VITALS
WEIGHT: 168 LBS | HEIGHT: 63 IN | TEMPERATURE: 97.2 F | RESPIRATION RATE: 16 BRPM | HEART RATE: 64 BPM | DIASTOLIC BLOOD PRESSURE: 113 MMHG | SYSTOLIC BLOOD PRESSURE: 172 MMHG | OXYGEN SATURATION: 98 % | BODY MASS INDEX: 29.77 KG/M2

## 2025-06-20 DIAGNOSIS — Z00.00 ROUTINE HEALTH MAINTENANCE: Primary | ICD-10-CM

## 2025-06-20 PROCEDURE — 99213 OFFICE O/P EST LOW 20 MIN: CPT

## 2025-06-20 RX ORDER — LISINOPRIL 10 MG/1
20 TABLET ORAL DAILY
Qty: 60 TABLET | Refills: 3 | Status: SHIPPED | OUTPATIENT
Start: 2025-06-20 | End: 2025-06-20

## 2025-06-20 RX ORDER — NIFEDIPINE 60 MG/1
60 TABLET, EXTENDED RELEASE ORAL DAILY
Qty: 90 TABLET | Refills: 1 | Status: SHIPPED | OUTPATIENT
Start: 2025-06-20

## 2025-06-20 RX ORDER — LISINOPRIL 20 MG/1
20 TABLET ORAL DAILY
Qty: 30 TABLET | Refills: 1 | Status: SHIPPED | OUTPATIENT
Start: 2025-06-20

## 2025-06-20 ASSESSMENT — PATIENT HEALTH QUESTIONNAIRE - PHQ9
10. IF YOU CHECKED OFF ANY PROBLEMS, HOW DIFFICULT HAVE THESE PROBLEMS MADE IT FOR YOU TO DO YOUR WORK, TAKE CARE OF THINGS AT HOME, OR GET ALONG WITH OTHER PEOPLE: SOMEWHAT DIFFICULT
6. FEELING BAD ABOUT YOURSELF - OR THAT YOU ARE A FAILURE OR HAVE LET YOURSELF OR YOUR FAMILY DOWN: SEVERAL DAYS
5. POOR APPETITE OR OVEREATING: MORE THAN HALF THE DAYS
SUM OF ALL RESPONSES TO PHQ QUESTIONS 1-9: 21
7. TROUBLE CONCENTRATING ON THINGS, SUCH AS READING THE NEWSPAPER OR WATCHING TELEVISION: NEARLY EVERY DAY
SUM OF ALL RESPONSES TO PHQ QUESTIONS 1-9: 21
9. THOUGHTS THAT YOU WOULD BE BETTER OFF DEAD, OR OF HURTING YOURSELF: NOT AT ALL
1. LITTLE INTEREST OR PLEASURE IN DOING THINGS: NEARLY EVERY DAY
4. FEELING TIRED OR HAVING LITTLE ENERGY: NEARLY EVERY DAY
SUM OF ALL RESPONSES TO PHQ QUESTIONS 1-9: 21
SUM OF ALL RESPONSES TO PHQ QUESTIONS 1-9: 21
3. TROUBLE FALLING OR STAYING ASLEEP: NEARLY EVERY DAY
2. FEELING DOWN, DEPRESSED OR HOPELESS: NEARLY EVERY DAY
8. MOVING OR SPEAKING SO SLOWLY THAT OTHER PEOPLE COULD HAVE NOTICED. OR THE OPPOSITE, BEING SO FIGETY OR RESTLESS THAT YOU HAVE BEEN MOVING AROUND A LOT MORE THAN USUAL: NEARLY EVERY DAY

## 2025-06-20 ASSESSMENT — COLUMBIA-SUICIDE SEVERITY RATING SCALE - C-SSRS
2. HAVE YOU ACTUALLY HAD ANY THOUGHTS OF KILLING YOURSELF?: NO
6. HAVE YOU EVER DONE ANYTHING, STARTED TO DO ANYTHING, OR PREPARED TO DO ANYTHING TO END YOUR LIFE?: NO
1. WITHIN THE PAST MONTH, HAVE YOU WISHED YOU WERE DEAD OR WISHED YOU COULD GO TO SLEEP AND NOT WAKE UP?: NO

## 2025-06-20 NOTE — PROGRESS NOTES
[facility]    Eli Pérez is a 49 y.o. female, with a MHx significant for uncontrolled hypertension, HLD, who presents to the clinic for ED follow-up    HPI    Patient went to the ED on 4/4 for hypertensive urgency 2/2 medication non compliance.    She presented with episodes of light headedness, blurry vision and nausea. BP upon arrival was 166/105. During this visit reported not taking her blood pressure medications for 1 month. Patient received tylenol for headache and was discharged on lisinopril 10 mg in addition to her home medications of amlodipine 5 mg and Hyzaar 50-12.5 mg. CT abd pelv done during this visit showed dermoid cyst. Labs unremarkable.    Today, patient is here for ED follow-up. Patient states she has been taking her lisinopril since her ED visit, but has not been taking her other anti-hypertensives. Reports feeling overwhelmed with creating a routine for taking medications due to stressors in her life and an overall depressed mood. Ms. Pérez states it is very difficult to do day to day tasks and she often feels overwhelmed and fatigued. She often feels sad most days and this contributes to her difficulty completing day to day tasks. She has a good support system with her family but still endorses feelings of sadness on most days.     When she was taking her medications, she reports the diuretic was very difficult to tolerate as it made her use the restroom frequently at work which was often pointed out by her boss.       Review of Systems   Psychiatric/Behavioral:  Positive for dysphoric mood. Negative for suicidal ideas. The patient is nervous/anxious.        MEDICATION:  Prior to Visit Medications    Medication Sig Taking? Authorizing Provider   lisinopril (PRINIVIL;ZESTRIL) 10 MG tablet Take 1 tablet by mouth daily  Lio Craft PA-C   amLODIPine (NORVASC) 5 MG tablet Take 1 tablet by mouth at bedtime  Patient not taking: Reported on 4/4/2025

## 2025-06-20 NOTE — PATIENT INSTRUCTIONS
Blood pressure medications:  - lisinopril 20 mg every morning  - nifedipine 60 mg every morning    Depression medication:  - sertraline 50 mg ever day     Please get lab work done before your next visit.    Please follow up in 6 weeks, thank you!    [facility] Financial Resources*  (Call [employer]/211 if need more resources.)      "[employer] 211   Speak to a trained professional 24/7 who can connect you to essential community services including food, clothing, transportation, housing, utilities, employment services, childcare, and baby supplies. 211 serves nationwide.   [employer] for resources in [place], [facility], [facility] in [place]; [place], [place], [place], and [facility] in [place].   [employer]/resources for resources in hospitals, [place], [place], [place], [place], [place], [place], [facility], [place], [place], and [facility] in [place].     [employer] Financial Assistance  What they offer: Financial assistance programs that are designed to assist you in finding resources that may help pay your hospital bill. Please click on the links below to learn more about the financial assistance programs available within our regions.  Phone Number: [phone]  How to apply for the [facility] Financial Assistance Program:       Option 1: To apply for financial assistance, a patient (or their family or other provider) should fill out the Financial Assistance Application. Copies of the Financial Assistance Application and the FAP may be obtained for free by calling the [facility] Customer Service department at [phone]   Option 2: The Financial Assistance Application and policy may be obtained for free by downloading a copy from the [employer] website:  https://www.[employer]/patient-resources/financial-assistance  [place] [facility] Assurance Program  What they offer:  Patients who need hospital care, but are unable to pay for it, may be eligible for free or reduced